# Patient Record
Sex: FEMALE | Race: WHITE | NOT HISPANIC OR LATINO | Employment: OTHER | URBAN - METROPOLITAN AREA
[De-identification: names, ages, dates, MRNs, and addresses within clinical notes are randomized per-mention and may not be internally consistent; named-entity substitution may affect disease eponyms.]

---

## 2017-03-14 PROBLEM — R43.0 LOSS OF SENSE OF SMELL: Status: ACTIVE | Noted: 2017-03-14

## 2017-03-14 PROBLEM — R09.81 NASAL CONGESTION: Status: ACTIVE | Noted: 2017-03-14

## 2017-03-14 PROBLEM — J34.89 LESION OF NOSTRIL: Status: ACTIVE | Noted: 2017-03-14

## 2017-05-03 PROBLEM — I83.90 VARICOSITIES OF LEG: Status: ACTIVE | Noted: 2017-05-03

## 2017-05-03 PROBLEM — F17.200 CURRENT SMOKER: Status: ACTIVE | Noted: 2017-05-03

## 2017-05-03 PROBLEM — M25.562 LEFT KNEE PAIN: Status: ACTIVE | Noted: 2017-05-03

## 2017-05-03 PROBLEM — M25.562 PAIN AND SWELLING OF LEFT KNEE: Status: ACTIVE | Noted: 2017-05-03

## 2017-05-03 PROBLEM — R09.81 NASAL CONGESTION: Status: RESOLVED | Noted: 2017-03-14 | Resolved: 2017-05-03

## 2017-05-03 PROBLEM — M25.462 PAIN AND SWELLING OF LEFT KNEE: Status: ACTIVE | Noted: 2017-05-03

## 2017-05-10 PROBLEM — R29.898 KNEE CLICKING: Status: ACTIVE | Noted: 2017-05-10

## 2017-05-10 PROBLEM — M25.362 INSTABILITY OF LEFT KNEE JOINT: Status: ACTIVE | Noted: 2017-05-10

## 2017-05-19 PROBLEM — R43.8 DECREASED SENSE OF SMELL: Status: ACTIVE | Noted: 2017-03-14

## 2017-05-29 ENCOUNTER — OFFICE VISIT (OUTPATIENT)
Dept: ORTHOPEDICS | Facility: CLINIC | Age: 62
End: 2017-05-29
Payer: COMMERCIAL

## 2017-05-29 VITALS
HEART RATE: 77 BPM | BODY MASS INDEX: 25.1 KG/M2 | DIASTOLIC BLOOD PRESSURE: 95 MMHG | HEIGHT: 64 IN | WEIGHT: 147 LBS | SYSTOLIC BLOOD PRESSURE: 149 MMHG

## 2017-05-29 DIAGNOSIS — M17.12 ARTHRITIS OF KNEE, LEFT: ICD-10-CM

## 2017-05-29 DIAGNOSIS — S83.209A MENISCUS TEAR: Primary | ICD-10-CM

## 2017-05-29 PROCEDURE — 99203 OFFICE O/P NEW LOW 30 MIN: CPT | Mod: 25,S$GLB,, | Performed by: ORTHOPAEDIC SURGERY

## 2017-05-29 PROCEDURE — 20610 DRAIN/INJ JOINT/BURSA W/O US: CPT | Mod: LT,S$GLB,, | Performed by: ORTHOPAEDIC SURGERY

## 2017-05-29 RX ORDER — TRIAMCINOLONE ACETONIDE 40 MG/ML
40 INJECTION, SUSPENSION INTRA-ARTICULAR; INTRAMUSCULAR
Status: DISCONTINUED | OUTPATIENT
Start: 2017-05-29 | End: 2017-05-29 | Stop reason: HOSPADM

## 2017-05-29 RX ADMIN — TRIAMCINOLONE ACETONIDE 40 MG: 40 INJECTION, SUSPENSION INTRA-ARTICULAR; INTRAMUSCULAR at 12:05

## 2017-05-29 NOTE — PROGRESS NOTES
Past Medical History:   Diagnosis Date    Attention deficit disorder without mention of hyperactivity     Breast cancer     Breast cancer 2010    left    Encounter for blood transfusion     Generalized osteoarthrosis, unspecified site     Hyperlipidemia     Hypertension     Spinal stenosis        Past Surgical History:   Procedure Laterality Date    BREAST SURGERY  2010    Double Mastectomy, Cancer    CARPAL TUNNEL RELEASE      EYE SURGERY      BILAT CATARACT    MASTECTOMY      double    SYMPATHECTOMY  1982 or 1983    TONSILLECTOMY         Current Outpatient Prescriptions   Medication Sig    aspirin 325 MG tablet Take 325 mg by mouth once daily.    benazepril (LOTENSIN) 20 MG tablet Take 1 tablet (20 mg total) by mouth once daily.    dextroamphetamine-amphetamine (ADDERALL) 20 mg tablet Take 1 tablet by mouth 2 (two) times daily as needed.    fluticasone (FLONASE) 50 mcg/actuation nasal spray 2 sprays by Each Nare route once daily.    lidocaine (LIDODERM) 5 % Place 1 patch onto the skin once daily. Remove & Discard patch within 12 hours or as directed by MD    multivitamin (ONE DAILY MULTIVITAMIN) per tablet Take 1 tablet by mouth once daily.     No current facility-administered medications for this visit.        Review of patient's allergies indicates:   Allergen Reactions    Zocor [simvastatin] Rash       Family History   Problem Relation Age of Onset    Heart disease Mother        Social History     Social History    Marital status:      Spouse name: N/A    Number of children: N/A    Years of education: N/A     Occupational History    Not on file.     Social History Main Topics    Smoking status: Current Every Day Smoker     Packs/day: 0.50     Years: 42.00     Types: Cigarettes    Smokeless tobacco: Not on file    Alcohol use Yes    Drug use: No    Sexual activity: Not on file     Other Topics Concern    Not on file     Social History Narrative    No narrative on file  "      Chief Complaint:   Chief Complaint   Patient presents with    Left Knee - Pain       Consulting Physician: Self, Aaareferral    History of present illness:    This is a 62 y.o. year old female who complains of left knee pain that is getting worse in last few months.  She puts her pain is a 4 out of 10 but worse with activities.  She states he occasionally has a sharp stabbing pain on the lateral aspect of the knee.    Review of Systems:    Constitution: Denies chills, fever, and sweats.  HENT: Denies headaches or blurry vision.  Cardiovascular: Denies chest pain or irregular heart beat.  Respiratory: Denies cough or shortness of breath.  Gastrointestinal: Denies abdominal pain, nausea, or vomiting.  Musculoskeletal:  Denies muscle cramps.  Neurological: Denies dizziness or focal weakness.  Psychiatric/Behavioral: Normal mental status.  Hematologic/Lymphatic: Denies bleeding problem or easy bruising/bleeding.  Skin: Denies rash or suspicious lesions.    Examination:    Vital Signs:    Vitals:    05/29/17 1041   BP: (!) 149/95   Pulse: 77   Weight: 66.7 kg (147 lb)   Height: 5' 3.5" (1.613 m)   PainSc:   4   PainLoc: Knee       Body mass index is 25.63 kg/m².    This a well-developed, well nourished patient in no acute distress.    Alert and oriented and cooperative to examination.       Physical Exam: Left Knee Exam    Gait   Normal    Skin  Rash:   None  Scars:   None    Inspection  Erythema:  None  Bruising:  None  Effusion:  None  Masses:  None  Lymphadenopathy: None    Range of Motion: 0 to 130° with pain    Medial Joint : Mild  Lateral Joint : Yes    Patellofemoral Tenderness: None  Patellofemoral Crepitus: None    Lachman:  Normal  Anterior Drawer: Normal  Posterior Drawer: Normal    Brian's:  Positive  Apley's:  Positive    Varus Stress:  Stable  Valgus Stress:  Stable    Strength:  5/5    Pulses:  Palpable distal    Sensation:  Intact          Imaging: MRI is available for " review today.  It shows a tear of the lateral meniscus that is complex in both the anterior horn and the body.  She also has some degenerative changes.        Assessment: Meniscus tear  -     Large Joint Aspiration/Injection    Arthritis of knee, left        Plan:  We'll go ahead and give her an injection today for her meniscus tear.  We explained to her that this will hopefully relieve her pain but if it does not we may need to do a arthroscopy of the knee.  At her next visit we will get x-rays of both knees.      DISCLAIMER: This note may have been dictated using voice recognition software and may contain grammatical errors.     NOTE: Consult report sent to referring provider via [employer] E[employer].

## 2017-05-29 NOTE — PROCEDURES
Large Joint Aspiration/Injection  Date/Time: 5/29/2017 12:04 PM  Performed by: JIAN WITT  Authorized by: JIAN WITT     Consent Done?:  Yes (Verbal)  Indications:  Pain  Timeout: Prior to procedure the correct patient, procedure, and site was verified      Location:  Knee  Site:  L knee  Prep: Patient was prepped and draped in usual sterile fashion    Approach:  Anteromedial  Medications:  40 mg triamcinolone acetonide 40 mg/mL

## 2017-06-07 ENCOUNTER — OFFICE VISIT (OUTPATIENT)
Dept: ORTHOPEDICS | Facility: CLINIC | Age: 62
End: 2017-06-07
Payer: COMMERCIAL

## 2017-06-07 VITALS
DIASTOLIC BLOOD PRESSURE: 83 MMHG | HEART RATE: 71 BPM | BODY MASS INDEX: 25.11 KG/M2 | SYSTOLIC BLOOD PRESSURE: 120 MMHG | WEIGHT: 147.06 LBS | HEIGHT: 64 IN

## 2017-06-07 DIAGNOSIS — M25.561 PAIN IN BOTH KNEES, UNSPECIFIED CHRONICITY: Primary | ICD-10-CM

## 2017-06-07 DIAGNOSIS — S83.242S ACUTE MEDIAL MENISCUS TEAR, LEFT, SEQUELA: Primary | ICD-10-CM

## 2017-06-07 DIAGNOSIS — M25.562 PAIN IN BOTH KNEES, UNSPECIFIED CHRONICITY: Primary | ICD-10-CM

## 2017-06-07 DIAGNOSIS — S83.209A MENISCUS TEAR: Primary | ICD-10-CM

## 2017-06-07 DIAGNOSIS — M17.12 ARTHRITIS OF KNEE, LEFT: ICD-10-CM

## 2017-06-07 PROCEDURE — 99214 OFFICE O/P EST MOD 30 MIN: CPT | Mod: 25,S$GLB,, | Performed by: ORTHOPAEDIC SURGERY

## 2017-06-07 PROCEDURE — 20610 DRAIN/INJ JOINT/BURSA W/O US: CPT | Mod: LT,S$GLB,, | Performed by: ORTHOPAEDIC SURGERY

## 2017-06-07 RX ORDER — TRIAMCINOLONE ACETONIDE 40 MG/ML
40 INJECTION, SUSPENSION INTRA-ARTICULAR; INTRAMUSCULAR
Status: DISCONTINUED | OUTPATIENT
Start: 2017-06-07 | End: 2017-06-08 | Stop reason: HOSPADM

## 2017-06-07 RX ADMIN — TRIAMCINOLONE ACETONIDE 40 MG: 40 INJECTION, SUSPENSION INTRA-ARTICULAR; INTRAMUSCULAR at 10:06

## 2017-06-08 RX ORDER — MUPIROCIN 20 MG/G
1 OINTMENT TOPICAL
Status: CANCELLED | OUTPATIENT
Start: 2017-06-08

## 2017-06-08 NOTE — PROGRESS NOTES
Past Medical History:   Diagnosis Date    Attention deficit disorder without mention of hyperactivity     Breast cancer     Breast cancer 2010    left    Encounter for blood transfusion     Generalized osteoarthrosis, unspecified site     Hyperlipidemia     Hypertension     Spinal stenosis        Past Surgical History:   Procedure Laterality Date    BREAST SURGERY  2010    Double Mastectomy, Cancer    CARPAL TUNNEL RELEASE      EYE SURGERY      BILAT CATARACT    MASTECTOMY      double    SYMPATHECTOMY  1982 or 1983    TONSILLECTOMY         Current Outpatient Prescriptions   Medication Sig    aspirin 325 MG tablet Take 325 mg by mouth once daily.    benazepril (LOTENSIN) 20 MG tablet Take 1 tablet (20 mg total) by mouth once daily.    dextroamphetamine-amphetamine (ADDERALL) 20 mg tablet Take 1 tablet by mouth 2 (two) times daily as needed.    fluticasone (FLONASE) 50 mcg/actuation nasal spray 2 sprays by Each Nare route once daily.    lidocaine (LIDODERM) 5 % Place 1 patch onto the skin once daily. Remove & Discard patch within 12 hours or as directed by MD    multivitamin (ONE DAILY MULTIVITAMIN) per tablet Take 1 tablet by mouth once daily.     No current facility-administered medications for this visit.        Review of patient's allergies indicates:   Allergen Reactions    Zocor [simvastatin] Rash       Family History   Problem Relation Age of Onset    Heart disease Mother        Social History     Social History    Marital status:      Spouse name: N/A    Number of children: N/A    Years of education: N/A     Occupational History    Not on file.     Social History Main Topics    Smoking status: Current Every Day Smoker     Packs/day: 0.50     Years: 42.00     Types: Cigarettes    Smokeless tobacco: Not on file    Alcohol use Yes    Drug use: No    Sexual activity: Not on file     Other Topics Concern    Not on file     Social History Narrative    No narrative on file  "      Chief Complaint:   Chief Complaint   Patient presents with    Right Knee - Pain    Left Knee - Pain       Consulting Physician: No ref. provider found    History of present illness:    This is a 62 y.o. year old female who complains of left knee pain that is getting worse in last few months.  She puts her pain is a 6 out of 10 but worse with activities. Injection helped a little but wants surgery.    Review of Systems:    Constitution: Denies chills, fever, and sweats.  HENT: Denies headaches or blurry vision.  Cardiovascular: Denies chest pain or irregular heart beat.  Respiratory: Denies cough or shortness of breath.  Gastrointestinal: Denies abdominal pain, nausea, or vomiting.  Musculoskeletal:  Denies muscle cramps.  Neurological: Denies dizziness or focal weakness.  Psychiatric/Behavioral: Normal mental status.  Hematologic/Lymphatic: Denies bleeding problem or easy bruising/bleeding.  Skin: Denies rash or suspicious lesions.    Examination:    Vital Signs:    Vitals:    06/07/17 1514   BP: 120/83   Pulse: 71   Weight: 66.7 kg (147 lb 0.8 oz)   Height: 5' 3.5" (1.613 m)   PainSc:   6   PainLoc: Knee       Body mass index is 25.64 kg/m².    This a well-developed, well nourished patient in no acute distress.    Alert and oriented and cooperative to examination.       Physical Exam: Left Knee Exam    Gait   Normal    Skin  Rash:   None  Scars:   None    Inspection  Erythema:  None  Bruising:  None  Effusion:  None  Masses:  None  Lymphadenopathy: None    Range of Motion: 0 to 130° with pain    Medial Joint : Mild  Lateral Joint : Yes    Patellofemoral Tenderness: None  Patellofemoral Crepitus: None    Lachman:  Normal  Anterior Drawer: Normal  Posterior Drawer: Normal    Brian's:  Positive  Apley's:  Positive    Varus Stress:  Stable  Valgus Stress:  Stable    Strength:  5/5    Pulses:  Palpable distal    Sensation:  Intact          Imaging: XR of both knees show moderate changes " in the left knee mostly lateral and mild changes in the right knee. MRI shows a tear of the lateral meniscus that is complex in both the anterior horn and the body and a small posterior horn medial meniscus tear.  She also has some degenerative changes.        Assessment: Meniscus tear    Arthritis of knee, left  -     Large Joint Aspiration/Injection        Plan:  We discussed options again today and she would like to proceed with arthroscopy of her knee. We discussed risks and benefits including partial pain relief due to her underlying arthritis. Informed consent obtained.     The risks, benefits, and alternatives to the procedure were explained to the patient including, but not limited to: incomplete pain relief, surgical failure, hardware failure, need for further procedures, damage to nerves, arteries, blood vessels and other structures, infection, Deep Vein Thrombosis (DVT), Pulmonary Embolus (PE), Complex Regional Pain Syndrome as well as general anesthetic complications including seizure, stroke, heart attack and even death. The patient understood these risks and wished to proceed and signed the informed consent. All questions were answered. No guarantees were implied or stated.    We will obtain the necessary clearances and schedule the patient for surgery.          DISCLAIMER: This note may have been dictated using voice recognition software and may contain grammatical errors.     NOTE: Consult report sent to referring provider via [employer].

## 2017-06-08 NOTE — PROCEDURES
Large Joint Aspiration/Injection  Date/Time: 6/7/2017 10:23 PM  Performed by: JIAN WITT  Authorized by: JIAN WITT     Consent Done?:  Yes (Verbal)  Indications:  Pain  Timeout: Prior to procedure the correct patient, procedure, and site was verified      Location:  Knee  Site:  L knee  Prep: Patient was prepped and draped in usual sterile fashion    Approach:  Anteromedial  Medications:  40 mg triamcinolone acetonide 40 mg/mL

## 2017-06-16 ENCOUNTER — HOSPITAL ENCOUNTER (OUTPATIENT)
Dept: RADIOLOGY | Facility: HOSPITAL | Age: 62
Discharge: HOME | End: 2017-06-16
Attending: ORTHOPAEDIC SURGERY
Payer: COMMERCIAL

## 2017-06-16 ENCOUNTER — HOSPITAL ENCOUNTER (OUTPATIENT)
Dept: PREADMISSION TESTING | Facility: HOSPITAL | Age: 62
Discharge: HOME | End: 2017-06-16
Attending: ORTHOPAEDIC SURGERY
Payer: COMMERCIAL

## 2017-06-16 VITALS — WEIGHT: 145 LBS | BODY MASS INDEX: 24.75 KG/M2 | HEIGHT: 64 IN

## 2017-06-16 DIAGNOSIS — S83.209A MENISCUS TEAR: ICD-10-CM

## 2017-06-16 DIAGNOSIS — M17.12 ARTHRITIS OF KNEE, LEFT: ICD-10-CM

## 2017-06-16 LAB
ANION GAP SERPL CALC-SCNC: 10 MMOL/L
BASOPHILS # BLD AUTO: 0.1 K/UL
BASOPHILS NFR BLD: 0.6 %
BILIRUB UR QL STRIP: NEGATIVE
BUN SERPL-MCNC: 15 MG/DL
CALCIUM SERPL-MCNC: 9.8 MG/DL
CHLORIDE SERPL-SCNC: 103 MMOL/L
CLARITY UR: CLEAR
CO2 SERPL-SCNC: 28 MMOL/L
COLOR UR: YELLOW
CREAT SERPL-MCNC: 0.7 MG/DL
DIFFERENTIAL METHOD: ABNORMAL
EOSINOPHIL # BLD AUTO: 0.2 K/UL
EOSINOPHIL NFR BLD: 2.2 %
ERYTHROCYTE [DISTWIDTH] IN BLOOD BY AUTOMATED COUNT: 13.6 %
EST. GFR  (AFRICAN AMERICAN): >60 ML/MIN/1.73 M^2
EST. GFR  (NON AFRICAN AMERICAN): >60 ML/MIN/1.73 M^2
GLUCOSE SERPL-MCNC: 68 MG/DL
GLUCOSE UR QL STRIP: NEGATIVE
HCT VFR BLD AUTO: 46.2 %
HGB BLD-MCNC: 15.4 G/DL
HGB UR QL STRIP: ABNORMAL
KETONES UR QL STRIP: NEGATIVE
LEUKOCYTE ESTERASE UR QL STRIP: NEGATIVE
LYMPHOCYTES # BLD AUTO: 2.4 K/UL
LYMPHOCYTES NFR BLD: 25.5 %
MCH RBC QN AUTO: 32.8 PG
MCHC RBC AUTO-ENTMCNC: 33.4 %
MCV RBC AUTO: 98 FL
MONOCYTES # BLD AUTO: 0.6 K/UL
MONOCYTES NFR BLD: 6.3 %
NEUTROPHILS # BLD AUTO: 6.2 K/UL
NEUTROPHILS NFR BLD: 65.4 %
NITRITE UR QL STRIP: NEGATIVE
PH UR STRIP: 6 [PH] (ref 5–8)
PLATELET # BLD AUTO: 315 K/UL
PMV BLD AUTO: 7.9 FL
POTASSIUM SERPL-SCNC: 4.3 MMOL/L
PROT UR QL STRIP: NEGATIVE
RBC # BLD AUTO: 4.71 M/UL
SODIUM SERPL-SCNC: 141 MMOL/L
SP GR UR STRIP: 1.01 (ref 1–1.03)
URN SPEC COLLECT METH UR: ABNORMAL
UROBILINOGEN UR STRIP-ACNC: NEGATIVE EU/DL
WBC # BLD AUTO: 9.5 K/UL

## 2017-06-16 PROCEDURE — 99900103 DSU ONLY-NO CHARGE-INITIAL HR (STAT)

## 2017-06-16 PROCEDURE — 71020 XR CHEST PA AND LATERAL: CPT | Mod: 26,,, | Performed by: RADIOLOGY

## 2017-06-16 PROCEDURE — 71020 XR CHEST PA AND LATERAL: CPT | Mod: TC

## 2017-06-16 PROCEDURE — 81003 URINALYSIS AUTO W/O SCOPE: CPT

## 2017-06-16 PROCEDURE — 93010 ELECTROCARDIOGRAM REPORT: CPT | Mod: S$GLB,,, | Performed by: INTERNAL MEDICINE

## 2017-06-16 PROCEDURE — 99900104 DSU ONLY-NO CHARGE-EA ADD'L HR (STAT)

## 2017-06-16 PROCEDURE — 36415 COLL VENOUS BLD VENIPUNCTURE: CPT

## 2017-06-16 PROCEDURE — 80048 BASIC METABOLIC PNL TOTAL CA: CPT

## 2017-06-16 PROCEDURE — 93005 ELECTROCARDIOGRAM TRACING: CPT

## 2017-06-16 PROCEDURE — 85025 COMPLETE CBC W/AUTO DIFF WBC: CPT

## 2017-06-16 RX ORDER — MAGNESIUM 250 MG
TABLET ORAL DAILY
COMMUNITY

## 2017-06-26 PROBLEM — S83.209A MENISCUS TEAR: Status: ACTIVE | Noted: 2017-06-26

## 2017-06-28 ENCOUNTER — ANESTHESIA EVENT (OUTPATIENT)
Dept: SURGERY | Facility: HOSPITAL | Age: 62
End: 2017-06-28
Payer: COMMERCIAL

## 2017-06-29 ENCOUNTER — HOSPITAL ENCOUNTER (OUTPATIENT)
Facility: HOSPITAL | Age: 62
Discharge: HOME | End: 2017-06-29
Attending: ORTHOPAEDIC SURGERY | Admitting: ORTHOPAEDIC SURGERY
Payer: COMMERCIAL

## 2017-06-29 ENCOUNTER — ANESTHESIA (OUTPATIENT)
Dept: SURGERY | Facility: HOSPITAL | Age: 62
End: 2017-06-29
Payer: COMMERCIAL

## 2017-06-29 VITALS
BODY MASS INDEX: 24.75 KG/M2 | WEIGHT: 145 LBS | HEIGHT: 64 IN | TEMPERATURE: 98 F | SYSTOLIC BLOOD PRESSURE: 113 MMHG | HEART RATE: 52 BPM | OXYGEN SATURATION: 95 % | DIASTOLIC BLOOD PRESSURE: 74 MMHG | RESPIRATION RATE: 18 BRPM

## 2017-06-29 DIAGNOSIS — S83.209A MENISCUS TEAR: ICD-10-CM

## 2017-06-29 DIAGNOSIS — M17.12 ARTHRITIS OF KNEE, LEFT: ICD-10-CM

## 2017-06-29 PROCEDURE — 25000003 PHARM REV CODE 250: Performed by: ANESTHESIOLOGY

## 2017-06-29 PROCEDURE — 25000003 PHARM REV CODE 250: Performed by: NURSE ANESTHETIST, CERTIFIED REGISTERED

## 2017-06-29 PROCEDURE — 63600175 PHARM REV CODE 636 W HCPCS: Performed by: NURSE ANESTHETIST, CERTIFIED REGISTERED

## 2017-06-29 PROCEDURE — 71000015 HC POSTOP RECOV 1ST HR: Performed by: ORTHOPAEDIC SURGERY

## 2017-06-29 PROCEDURE — 29880 ARTHRS KNE SRG MNISECTMY M&L: CPT | Mod: LT,,, | Performed by: ORTHOPAEDIC SURGERY

## 2017-06-29 PROCEDURE — 27201423 OPTIME MED/SURG SUP & DEVICES STERILE SUPPLY: Performed by: ORTHOPAEDIC SURGERY

## 2017-06-29 PROCEDURE — 36000711: Performed by: ORTHOPAEDIC SURGERY

## 2017-06-29 PROCEDURE — 27200651 HC AIRWAY, LMA: Performed by: NURSE ANESTHETIST, CERTIFIED REGISTERED

## 2017-06-29 PROCEDURE — D9220A PRA ANESTHESIA: Mod: ANES,,, | Performed by: ANESTHESIOLOGY

## 2017-06-29 PROCEDURE — 99900104 DSU ONLY-NO CHARGE-EA ADD'L HR (STAT): Performed by: ORTHOPAEDIC SURGERY

## 2017-06-29 PROCEDURE — 63600175 PHARM REV CODE 636 W HCPCS: Performed by: ANESTHESIOLOGY

## 2017-06-29 PROCEDURE — 99900103 DSU ONLY-NO CHARGE-INITIAL HR (STAT): Performed by: ORTHOPAEDIC SURGERY

## 2017-06-29 PROCEDURE — 25000003 PHARM REV CODE 250: Performed by: ORTHOPAEDIC SURGERY

## 2017-06-29 PROCEDURE — 37000008 HC ANESTHESIA 1ST 15 MINUTES: Performed by: ORTHOPAEDIC SURGERY

## 2017-06-29 PROCEDURE — 71000039 HC RECOVERY, EACH ADD'L HOUR: Performed by: ORTHOPAEDIC SURGERY

## 2017-06-29 PROCEDURE — 37000009 HC ANESTHESIA EA ADD 15 MINS: Performed by: ORTHOPAEDIC SURGERY

## 2017-06-29 PROCEDURE — 63600175 PHARM REV CODE 636 W HCPCS: Performed by: ORTHOPAEDIC SURGERY

## 2017-06-29 PROCEDURE — 36000710: Performed by: ORTHOPAEDIC SURGERY

## 2017-06-29 PROCEDURE — D9220A PRA ANESTHESIA: Mod: CRNA,,, | Performed by: NURSE ANESTHETIST, CERTIFIED REGISTERED

## 2017-06-29 PROCEDURE — 71000033 HC RECOVERY, INTIAL HOUR: Performed by: ORTHOPAEDIC SURGERY

## 2017-06-29 RX ORDER — MIDAZOLAM HYDROCHLORIDE 1 MG/ML
INJECTION, SOLUTION INTRAMUSCULAR; INTRAVENOUS
Status: DISCONTINUED | OUTPATIENT
Start: 2017-06-29 | End: 2017-06-29

## 2017-06-29 RX ORDER — PHENYLEPHRINE HYDROCHLORIDE 10 MG/ML
INJECTION INTRAVENOUS
Status: DISCONTINUED | OUTPATIENT
Start: 2017-06-29 | End: 2017-06-29

## 2017-06-29 RX ORDER — BUPIVACAINE HYDROCHLORIDE 5 MG/ML
INJECTION, SOLUTION EPIDURAL; INTRACAUDAL
Status: DISCONTINUED | OUTPATIENT
Start: 2017-06-29 | End: 2017-06-29 | Stop reason: HOSPADM

## 2017-06-29 RX ORDER — FENTANYL CITRATE 50 UG/ML
25 INJECTION, SOLUTION INTRAMUSCULAR; INTRAVENOUS EVERY 5 MIN PRN
Status: COMPLETED | OUTPATIENT
Start: 2017-06-29 | End: 2017-06-29

## 2017-06-29 RX ORDER — FENTANYL CITRATE 50 UG/ML
INJECTION, SOLUTION INTRAMUSCULAR; INTRAVENOUS
Status: DISCONTINUED | OUTPATIENT
Start: 2017-06-29 | End: 2017-06-29

## 2017-06-29 RX ORDER — CEFAZOLIN SODIUM 2 G/50ML
2 SOLUTION INTRAVENOUS
Status: COMPLETED | OUTPATIENT
Start: 2017-06-29 | End: 2017-06-29

## 2017-06-29 RX ORDER — SODIUM CHLORIDE, SODIUM LACTATE, POTASSIUM CHLORIDE, CALCIUM CHLORIDE 600; 310; 30; 20 MG/100ML; MG/100ML; MG/100ML; MG/100ML
INJECTION, SOLUTION INTRAVENOUS CONTINUOUS
Status: DISCONTINUED | OUTPATIENT
Start: 2017-06-29 | End: 2017-06-29 | Stop reason: HOSPADM

## 2017-06-29 RX ORDER — SODIUM CHLORIDE 0.9 % (FLUSH) 0.9 %
3 SYRINGE (ML) INJECTION
Status: DISCONTINUED | OUTPATIENT
Start: 2017-06-29 | End: 2017-06-29 | Stop reason: HOSPADM

## 2017-06-29 RX ORDER — MUPIROCIN 20 MG/G
1 OINTMENT TOPICAL
Status: COMPLETED | OUTPATIENT
Start: 2017-06-29 | End: 2017-06-29

## 2017-06-29 RX ORDER — HYDROCODONE BITARTRATE AND ACETAMINOPHEN 5; 325 MG/1; MG/1
1 TABLET ORAL EVERY 4 HOURS PRN
Status: CANCELLED | OUTPATIENT
Start: 2017-06-29

## 2017-06-29 RX ORDER — HYDROCODONE BITARTRATE AND ACETAMINOPHEN 10; 325 MG/1; MG/1
1 TABLET ORAL EVERY 4 HOURS PRN
Status: CANCELLED | OUTPATIENT
Start: 2017-06-29

## 2017-06-29 RX ORDER — LIDOCAINE HCL/PF 100 MG/5ML
SYRINGE (ML) INTRAVENOUS
Status: DISCONTINUED | OUTPATIENT
Start: 2017-06-29 | End: 2017-06-29

## 2017-06-29 RX ORDER — EPINEPHRINE 1 MG/ML
INJECTION, SOLUTION INTRACARDIAC; INTRAMUSCULAR; INTRAVENOUS; SUBCUTANEOUS
Status: DISCONTINUED | OUTPATIENT
Start: 2017-06-29 | End: 2017-06-29 | Stop reason: HOSPADM

## 2017-06-29 RX ORDER — LIDOCAINE HYDROCHLORIDE 10 MG/ML
1 INJECTION, SOLUTION EPIDURAL; INFILTRATION; INTRACAUDAL; PERINEURAL ONCE
Status: COMPLETED | OUTPATIENT
Start: 2017-06-29 | End: 2017-06-29

## 2017-06-29 RX ORDER — OXYCODONE AND ACETAMINOPHEN 5; 325 MG/1; MG/1
1-2 TABLET ORAL EVERY 6 HOURS PRN
Qty: 60 TABLET | Refills: 0 | Status: SHIPPED | OUTPATIENT
Start: 2017-06-29 | End: 2017-07-24

## 2017-06-29 RX ORDER — OXYCODONE AND ACETAMINOPHEN 5; 325 MG/1; MG/1
1 TABLET ORAL
Status: DISCONTINUED | OUTPATIENT
Start: 2017-06-29 | End: 2017-06-29 | Stop reason: HOSPADM

## 2017-06-29 RX ORDER — ACETAMINOPHEN 10 MG/ML
INJECTION, SOLUTION INTRAVENOUS
Status: DISCONTINUED | OUTPATIENT
Start: 2017-06-29 | End: 2017-06-29

## 2017-06-29 RX ORDER — IBUPROFEN 600 MG/1
600 TABLET ORAL 4 TIMES DAILY
Qty: 60 TABLET | Refills: 0 | Status: SHIPPED | OUTPATIENT
Start: 2017-06-29

## 2017-06-29 RX ORDER — OXYCODONE HYDROCHLORIDE 5 MG/1
5 TABLET ORAL ONCE AS NEEDED
Status: COMPLETED | OUTPATIENT
Start: 2017-06-29 | End: 2017-06-29

## 2017-06-29 RX ORDER — IBUPROFEN 400 MG/1
800 TABLET ORAL 3 TIMES DAILY
Status: CANCELLED | OUTPATIENT
Start: 2017-06-29

## 2017-06-29 RX ORDER — DEXAMETHASONE SODIUM PHOSPHATE 4 MG/ML
INJECTION, SOLUTION INTRA-ARTICULAR; INTRALESIONAL; INTRAMUSCULAR; INTRAVENOUS; SOFT TISSUE
Status: DISCONTINUED | OUTPATIENT
Start: 2017-06-29 | End: 2017-06-29

## 2017-06-29 RX ORDER — ONDANSETRON HYDROCHLORIDE 2 MG/ML
INJECTION, SOLUTION INTRAMUSCULAR; INTRAVENOUS
Status: DISCONTINUED | OUTPATIENT
Start: 2017-06-29 | End: 2017-06-29

## 2017-06-29 RX ORDER — PROPOFOL 10 MG/ML
VIAL (ML) INTRAVENOUS
Status: DISCONTINUED | OUTPATIENT
Start: 2017-06-29 | End: 2017-06-29

## 2017-06-29 RX ORDER — METOCLOPRAMIDE HYDROCHLORIDE 5 MG/ML
10 INJECTION INTRAMUSCULAR; INTRAVENOUS EVERY 10 MIN PRN
Status: COMPLETED | OUTPATIENT
Start: 2017-06-29 | End: 2017-06-29

## 2017-06-29 RX ADMIN — FENTANYL CITRATE 25 MCG: 50 INJECTION, SOLUTION INTRAMUSCULAR; INTRAVENOUS at 12:06

## 2017-06-29 RX ADMIN — PROPOFOL 150 MG: 10 INJECTION, EMULSION INTRAVENOUS at 11:06

## 2017-06-29 RX ADMIN — OXYCODONE HYDROCHLORIDE 5 MG: 5 TABLET ORAL at 12:06

## 2017-06-29 RX ADMIN — ACETAMINOPHEN 1000 MG: 10 INJECTION, SOLUTION INTRAVENOUS at 11:06

## 2017-06-29 RX ADMIN — FENTANYL CITRATE 25 MCG: 50 INJECTION, SOLUTION INTRAMUSCULAR; INTRAVENOUS at 01:06

## 2017-06-29 RX ADMIN — LIDOCAINE HYDROCHLORIDE 10 MG: 10 INJECTION, SOLUTION EPIDURAL; INFILTRATION; INTRACAUDAL; PERINEURAL at 08:06

## 2017-06-29 RX ADMIN — FENTANYL CITRATE 50 MCG: 50 INJECTION INTRAMUSCULAR; INTRAVENOUS at 11:06

## 2017-06-29 RX ADMIN — CEFAZOLIN SODIUM 2 G: 2 SOLUTION INTRAVENOUS at 11:06

## 2017-06-29 RX ADMIN — ONDANSETRON 4 MG: 2 INJECTION, SOLUTION INTRAMUSCULAR; INTRAVENOUS at 11:06

## 2017-06-29 RX ADMIN — SODIUM CHLORIDE, SODIUM LACTATE, POTASSIUM CHLORIDE, AND CALCIUM CHLORIDE: .6; .31; .03; .02 INJECTION, SOLUTION INTRAVENOUS at 08:06

## 2017-06-29 RX ADMIN — SODIUM CHLORIDE, SODIUM LACTATE, POTASSIUM CHLORIDE, AND CALCIUM CHLORIDE: .6; .31; .03; .02 INJECTION, SOLUTION INTRAVENOUS at 12:06

## 2017-06-29 RX ADMIN — METOCLOPRAMIDE 10 MG: 5 INJECTION, SOLUTION INTRAMUSCULAR; INTRAVENOUS at 12:06

## 2017-06-29 RX ADMIN — DEXAMETHASONE SODIUM PHOSPHATE 4 MG: 4 INJECTION, SOLUTION INTRAMUSCULAR; INTRAVENOUS at 11:06

## 2017-06-29 RX ADMIN — MIDAZOLAM 2 MG: 1 INJECTION INTRAMUSCULAR; INTRAVENOUS at 11:06

## 2017-06-29 RX ADMIN — PHENYLEPHRINE HYDROCHLORIDE 100 MCG: 10 INJECTION INTRAVENOUS at 11:06

## 2017-06-29 RX ADMIN — LIDOCAINE HYDROCHLORIDE 100 MG: 20 INJECTION, SOLUTION INTRAVENOUS at 11:06

## 2017-06-29 RX ADMIN — MUPIROCIN 1 G: 20 OINTMENT TOPICAL at 11:06

## 2017-06-29 RX ADMIN — PHENYLEPHRINE HYDROCHLORIDE 100 MCG: 10 INJECTION INTRAVENOUS at 12:06

## 2017-06-29 NOTE — H&P (VIEW-ONLY)
Past Medical History:   Diagnosis Date    Attention deficit disorder without mention of hyperactivity     Breast cancer     Breast cancer 2010    left    Encounter for blood transfusion     Generalized osteoarthrosis, unspecified site     Hyperlipidemia     Hypertension     Spinal stenosis        Past Surgical History:   Procedure Laterality Date    BREAST SURGERY  2010    Double Mastectomy, Cancer    CARPAL TUNNEL RELEASE      EYE SURGERY      BILAT CATARACT    MASTECTOMY      double    SYMPATHECTOMY  1982 or 1983    TONSILLECTOMY         Current Outpatient Prescriptions   Medication Sig    aspirin 325 MG tablet Take 325 mg by mouth once daily.    benazepril (LOTENSIN) 20 MG tablet Take 1 tablet (20 mg total) by mouth once daily.    dextroamphetamine-amphetamine (ADDERALL) 20 mg tablet Take 1 tablet by mouth 2 (two) times daily as needed.    fluticasone (FLONASE) 50 mcg/actuation nasal spray 2 sprays by Each Nare route once daily.    lidocaine (LIDODERM) 5 % Place 1 patch onto the skin once daily. Remove & Discard patch within 12 hours or as directed by MD    multivitamin (ONE DAILY MULTIVITAMIN) per tablet Take 1 tablet by mouth once daily.     No current facility-administered medications for this visit.        Review of patient's allergies indicates:   Allergen Reactions    Zocor [simvastatin] Rash       Family History   Problem Relation Age of Onset    Heart disease Mother        Social History     Social History    Marital status:      Spouse name: N/A    Number of children: N/A    Years of education: N/A     Occupational History    Not on file.     Social History Main Topics    Smoking status: Current Every Day Smoker     Packs/day: 0.50     Years: 42.00     Types: Cigarettes    Smokeless tobacco: Not on file    Alcohol use Yes    Drug use: No    Sexual activity: Not on file     Other Topics Concern    Not on file     Social History Narrative    No narrative on file  "      Chief Complaint:   Chief Complaint   Patient presents with    Right Knee - Pain    Left Knee - Pain       Consulting Physician: No ref. provider found    History of present illness:    This is a 62 y.o. year old female who complains of left knee pain that is getting worse in last few months.  She puts her pain is a 6 out of 10 but worse with activities. Injection helped a little but wants surgery.    Review of Systems:    Constitution: Denies chills, fever, and sweats.  HENT: Denies headaches or blurry vision.  Cardiovascular: Denies chest pain or irregular heart beat.  Respiratory: Denies cough or shortness of breath.  Gastrointestinal: Denies abdominal pain, nausea, or vomiting.  Musculoskeletal:  Denies muscle cramps.  Neurological: Denies dizziness or focal weakness.  Psychiatric/Behavioral: Normal mental status.  Hematologic/Lymphatic: Denies bleeding problem or easy bruising/bleeding.  Skin: Denies rash or suspicious lesions.    Examination:    Vital Signs:    Vitals:    06/07/17 1514   BP: 120/83   Pulse: 71   Weight: 66.7 kg (147 lb 0.8 oz)   Height: 5' 3.5" (1.613 m)   PainSc:   6   PainLoc: Knee       Body mass index is 25.64 kg/m².    This a well-developed, well nourished patient in no acute distress.    Alert and oriented and cooperative to examination.       Physical Exam: Left Knee Exam    Gait   Normal    Skin  Rash:   None  Scars:   None    Inspection  Erythema:  None  Bruising:  None  Effusion:  None  Masses:  None  Lymphadenopathy: None    Range of Motion: 0 to 130° with pain    Medial Joint : Mild  Lateral Joint : Yes    Patellofemoral Tenderness: None  Patellofemoral Crepitus: None    Lachman:  Normal  Anterior Drawer: Normal  Posterior Drawer: Normal    Brian's:  Positive  Apley's:  Positive    Varus Stress:  Stable  Valgus Stress:  Stable    Strength:  5/5    Pulses:  Palpable distal    Sensation:  Intact          Imaging: XR of both knees show moderate changes " in the left knee mostly lateral and mild changes in the right knee. MRI shows a tear of the lateral meniscus that is complex in both the anterior horn and the body and a small posterior horn medial meniscus tear.  She also has some degenerative changes.        Assessment: Meniscus tear    Arthritis of knee, left  -     Large Joint Aspiration/Injection        Plan:  We discussed options again today and she would like to proceed with arthroscopy of her knee. We discussed risks and benefits including partial pain relief due to her underlying arthritis. Informed consent obtained.     The risks, benefits, and alternatives to the procedure were explained to the patient including, but not limited to: incomplete pain relief, surgical failure, hardware failure, need for further procedures, damage to nerves, arteries, blood vessels and other structures, infection, Deep Vein Thrombosis (DVT), Pulmonary Embolus (PE), Complex Regional Pain Syndrome as well as general anesthetic complications including seizure, stroke, heart attack and even death. The patient understood these risks and wished to proceed and signed the informed consent. All questions were answered. No guarantees were implied or stated.    We will obtain the necessary clearances and schedule the patient for surgery.          DISCLAIMER: This note may have been dictated using voice recognition software and may contain grammatical errors.     NOTE: Consult report sent to referring provider via [employer].

## 2017-06-29 NOTE — PLAN OF CARE
Pt states feels comfortable with pain rated 2 on scale 1-10.  Left knee with ace wrap dressing = D&I.  Pulse to left foot = +2.  Pt able to move toes with no problems noted.  Pt tolerated po intake with n/v.  Pt states already has crutches at home.  The ice machine instructions reviewed with discharge instructions.

## 2017-06-29 NOTE — INTERVAL H&P NOTE
The patient has been examined and the H&P has been reviewed:    I concur with the findings and no changes have occurred since H&P was written.    Anesthesia/Surgery risks, benefits and alternative options discussed and understood by patient/family.          Active Hospital Problems    Diagnosis  POA    Meniscus tear [S83.784A]  Yes      Resolved Hospital Problems    Diagnosis Date Resolved POA   No resolved problems to display.

## 2017-06-29 NOTE — TRANSFER OF CARE
"Anesthesia Transfer of Care Note    Patient: Natalie Domingo    Procedure(s) Performed: Procedure(s) (LRB):  ARTHROSCOPY-MENISCECTOMY (Left)    Patient location: PACU    Anesthesia Type: general    Transport from OR: Transported from OR on room air with adequate spontaneous ventilation    Post pain: adequate analgesia    Post assessment: no apparent anesthetic complications    Post vital signs: stable    Level of consciousness: awake, oriented and alert    Nausea/Vomiting: no nausea/vomiting    Complications: none    Transfer of care protocol was followed      Last vitals:   Visit Vitals  /76 (BP Location: Right arm, Patient Position: Lying, BP Method: Automatic)   Pulse 71   Temp 36.4 °C (97.5 °F) (Oral)   Resp 18   Ht 5' 3.5" (1.613 m)   Wt 65.8 kg (145 lb)   SpO2 98%   BMI 25.28 kg/m²     "

## 2017-06-29 NOTE — BRIEF OP NOTE
[facility]  Brief Operative Note     SUMMARY     Surgery Date: 6/29/2017     Surgeon(s) and Role:     * Vinny Zarate MD - Primary    Assisting Surgeon: None    Pre-op Diagnosis:  Arthritis of knee, left [M17.12]  Meniscus tear [S83.209A]    Post-op Diagnosis:  Post-Op Diagnosis Codes:     * Arthritis of knee, left [M17.12]     * Meniscus tear [S83.209A]    Procedure(s) (LRB):  ARTHROSCOPY-MENISCECTOMY (Left)    Anesthesia: General    Description of the findings of the procedure: Left knee arthroscopy with medial and lateral menisectomy    Findings/Key Components: See Dictation     Estimated Blood Loss: 10 mL         Specimens:   Specimen (12h ago through future)    None          Discharge Note    SUMMARY     Admit Date: 6/29/2017    Discharge Date and Time: 6/29/2017     Hospital Course : Patient Tolerated Procedure Well     Final Diagnosis: Post-Op Diagnosis Codes:     * Arthritis of knee, left [M17.12]     * Meniscus tear [S83.209A]    Disposition: Home or Self Care    Follow Up/Patient Instructions:     Medications:  Reconciled Home Medications: Current Discharge Medication List      START taking these medications    Details   ibuprofen (ADVIL,MOTRIN) 600 MG tablet Take 1 tablet (600 mg total) by mouth 4 (four) times daily.  Qty: 60 tablet, Refills: 0      oxycodone-acetaminophen (PERCOCET) 5-325 mg per tablet Take 1-2 tablets by mouth every 6 (six) hours as needed for Pain.  Qty: 60 tablet, Refills: 0         CONTINUE these medications which have NOT CHANGED    Details   aspirin 325 MG tablet Take 325 mg by mouth once daily.      benazepril (LOTENSIN) 20 MG tablet Take 1 tablet (20 mg total) by mouth once daily.  Qty: 90 tablet, Refills: 3    Associated Diagnoses: Essential hypertension      dextroamphetamine-amphetamine (ADDERALL) 20 mg tablet Take 1 tablet by mouth 2 (two) times daily as needed.  Qty: 60 tablet, Refills: 0    Associated Diagnoses: ADD (attention deficit disorder)  "without hyperactivity      fluticasone (FLONASE) 50 mcg/actuation nasal spray 2 sprays by Each Nare route once daily.  Qty: 16 g, Refills: 1    Associated Diagnoses: Nasal congestion; Loss of sense of smell      magnesium 250 mg Tab Take by mouth once daily.      multivitamin (ONE DAILY MULTIVITAMIN) per tablet Take 1 tablet by mouth once daily.      lidocaine (LIDODERM) 5 % Place 1 patch onto the skin once daily. Remove & Discard patch within 12 hours or as directed by MD  Qty: 30 patch, Refills: 3             Discharge Procedure Orders  CRUTCHES FOR HOME USE   Order Specific Question Answer Comments   Type: Axillary    Height: 5' 3.5" (1.613 m)    Weight: 65.8 kg (145 lb)    Length of need (1-99 months): 1      Diet general     Activity as tolerated     Shower on day dressing removed (No bath)     Ice to affected area     Weight bearing as tolerated     No driving, operating heavy equipment or signing legal documents while taking pain medication     Call MD for:  temperature >100.4     Call MD for:  persistent nausea and vomiting     Call MD for:  severe uncontrolled pain     Call MD for:  difficulty breathing, headache or visual disturbances     Call MD for:  redness, tenderness, or signs of infection (pain, swelling, redness, odor or green/yellow discharge around incision site)     Call MD for:  hives     Call MD for:  persistent dizziness or light-headedness     Call MD for:  extreme fatigue     Remove dressing in 48 hours       Follow-up Information     Vinny Zarate MD On 7/10/2017.    Specialties:  Sports Medicine, Orthopedic Surgery  Contact information:  [address] [place] [id number]  [phone]               Dictation #1  MRN:[id number]  CSN:[id number]  [id number]  "

## 2017-06-29 NOTE — ANESTHESIA PREPROCEDURE EVALUATION
06/29/2017  Natalie Domingo is a 62 y.o., female.    Anesthesia Evaluation    I have reviewed the Patient Summary Reports.    I have reviewed the Nursing Notes.   I have reviewed the Medications.     Review of Systems  Anesthesia Hx:  Denies Family Hx of Anesthesia complications.   Denies Personal Hx of Anesthesia complications.   Cardiovascular:   Hypertension    Pulmonary:  Pulmonary Normal    Renal/:  Renal/ Normal     Hepatic/GI:  Hepatic/GI Normal    Musculoskeletal:   Arthritis     Neurological:   Headaches    Endocrine:  Endocrine Normal    Psych:   Psychiatric History          Physical Exam  General:  Well nourished    Airway/Jaw/Neck:  Airway Findings: Mouth Opening: Normal Tongue: Normal  General Airway Assessment: Adult  Mallampati: II  TM Distance: Normal, at least 6 cm  Jaw/Neck Findings:  Neck ROM: Normal ROM      Dental:  Dental Findings: In tact   Chest/Lungs:  Chest/Lungs Clear    Heart/Vascular:  Heart Findings: Normal            Anesthesia Plan  Type of Anesthesia, risks & benefits discussed:  Anesthesia Type:  general  Patient's Preference:   Intra-op Monitoring Plan: standard ASA monitors  Intra-op Monitoring Plan Comments:   Post Op Pain Control Plan:   Post Op Pain Control Plan Comments:   Induction:   IV  Beta Blocker:  Patient is not currently on a Beta-Blocker (No further documentation required).       Informed Consent: Patient understands risks and agrees with Anesthesia plan.  Questions answered. Anesthesia consent signed with patient.  ASA Score: 2     Day of Surgery Review of History & Physical:    H&P update referred to the surgeon.         Ready For Surgery From Anesthesia Perspective.

## 2017-06-29 NOTE — ANESTHESIA POSTPROCEDURE EVALUATION
"Anesthesia Post Evaluation    Patient: Natalie Domingo    Procedure(s) Performed: Procedure(s) (LRB):  ARTHROSCOPY-MENISCECTOMY (Left)    Final Anesthesia Type: general  Patient location during evaluation: PACU  Patient participation: Yes- Able to Participate  Level of consciousness: awake and alert  Post-procedure vital signs: reviewed and stable  Pain management: adequate  Airway patency: patent  PONV status at discharge: No PONV  Anesthetic complications: no      Cardiovascular status: blood pressure returned to baseline  Respiratory status: unassisted  Hydration status: euvolemic  Follow-up not needed.        Visit Vitals  /78 (BP Location: Right arm, Patient Position: Lying, BP Method: Automatic)   Pulse 62   Temp 36.5 °C (97.7 °F) (Temporal)   Resp 18   Ht 5' 3.5" (1.613 m)   Wt 65.8 kg (145 lb)   SpO2 96%   BMI 25.28 kg/m²       Pain/Radha Score: Pain Assessment Performed: Yes (6/29/2017  1:30 PM)  Presence of Pain: denies (6/29/2017 12:33 PM)  Pain Rating Prior to Med Admin: 2 (6/29/2017  1:10 PM)  Pain Rating Post Med Admin: 2 (6/29/2017  1:15 PM)  Radha Score: 10 (6/29/2017  1:30 PM)      "

## 2017-06-30 ENCOUNTER — TELEPHONE (OUTPATIENT)
Dept: ORTHOPEDICS | Facility: CLINIC | Age: 62
End: 2017-06-30

## 2017-06-30 NOTE — TELEPHONE ENCOUNTER
Pt is post op day one, attempted to call [phone] and [phone] to check on patient.  No answer, no voicemail on either number.

## 2017-07-05 DIAGNOSIS — Z98.890 STATUS POST ARTHROSCOPIC SURGERY OF LEFT KNEE: Primary | ICD-10-CM

## 2017-07-10 ENCOUNTER — OFFICE VISIT (OUTPATIENT)
Dept: ORTHOPEDICS | Facility: CLINIC | Age: 62
End: 2017-07-10
Payer: COMMERCIAL

## 2017-07-10 VITALS
HEIGHT: 64 IN | DIASTOLIC BLOOD PRESSURE: 87 MMHG | WEIGHT: 145.06 LBS | HEART RATE: 73 BPM | BODY MASS INDEX: 24.77 KG/M2 | SYSTOLIC BLOOD PRESSURE: 131 MMHG

## 2017-07-10 DIAGNOSIS — Z98.890 STATUS POST ARTHROSCOPIC SURGERY OF LEFT KNEE: Primary | ICD-10-CM

## 2017-07-10 PROCEDURE — 99024 POSTOP FOLLOW-UP VISIT: CPT | Mod: S$GLB,,, | Performed by: ORTHOPAEDIC SURGERY

## 2017-07-10 NOTE — OP NOTE
Preoperative diagnosis: Left knee meniscus tear    Postoperative diagnosis: Left knee medial and lateral meniscus tear    Procedure: Left knee arthroscopic medial and lateral partial meniscectomies    Surgeon: Vinny Zarate    Asst: Colleen Lainezs: General    History: The patient is a 62-year-old female who presented to our office with a complaint of left knee pain.  Her imaging studies were consistent with a medial lateral meniscal pathology.  We expressed to her that I recommended course of action would be a arthroscopy with debridement.  The risk and benefits of surgical intervention including the potential for incomplete pain relief were explained to the patient who expressed that she understood and wished to proceed.  Informed consent was obtained.    After verifying informed consent and correct site and the patient was brought back to the operating room placed on the OR table in a supine position.  IV antibiotics were administered and a timeout was performed.  Closure was then prepped and draped in sterile fashion.  We began by creating a lateral portal.  Upon entering the knee with some minor fraying of the patella and trochlear groove along with a fissure in the trochlear groove.  The cartilage was stable to probing through a medial portal and so we elected to leave it alone.  We then proceeded into the lateral gutter which was clear.  We then proceeded to the medial compartment were found our medial meniscus tear.  Using a combination of karl and biters the meniscus was debrided down to a stable rim.  We then proceeded into the notch.  The anterior cruciate ligament was palpated and visualized and found to be normal.  We then proceeded to the lateral compartment where there was a tear of the anterior horn of the lateral meniscus.  We debrided this down to a stable rim taking down most of the anterior lateral meniscus.  There was some cartilage changes consistent with arthritis throughout the  lateral compartment.  We completed our debridement with an exit of the knee.  The portals were closed using 3-0 nylon.  A sterile dressing was applied along with a Ace bandage from foot to thigh.  The patient was then awoke from anesthesia extubated and brought to the PACU in good condition.    Total tourniquet time none    Drains none    Specimens: none    Complications none    Blood loss minimal    Postoperative plan: The patient will be discharged home later today and follow up with us in clinic in 2 weeks' time.  She is given complete postoperative instructions and pain medication.

## 2017-07-14 NOTE — OP NOTE
DATE OF PROCEDURE:  06/29/2017    PREOPERATIVE DIAGNOSIS:  Left knee lateral meniscus tear.    POSTOPERATIVE DIAGNOSIS:  Left knee lateral and medial meniscus tear.    PROCEDURE:  Left knee arthroscopic medial and lateral partial meniscectomy.    SURGEON:  Vinny Zarate M.D.    ASSISTANT:  Colleen Otoole.    ANESTHESIA:  General with local infiltrate.    HISTORY:  Ms. Domingo is a 62-year-old woman who presented to our office with a   complaint of knee pain.  Her imaging studies and examination were consistent   with a small medial meniscus tear and a larger complex lateral meniscus tear.    We discussed treatment options, since she elected to proceed with an arthroscopy   of the knee.  We expressed to her that we will do a cleanup of the meniscus   tears, but that she did have some underlying arthritis, which would continue to   give her some discomfort.  The risks and benefits of surgical intervention   including the potential for incomplete pain relief and the need for further   procedures were explained to the patient who expressed that she understood and   wished to proceed.  Informed consent was obtained.    PROCEDURE IN DETAIL:  After verifying informed consent and correct site, the   patient was brought back to the Operating Room, placed on the OR table in supine   position.  Preoperative IV antibiotics were administered and a timeout was   performed.  Left lower extremity was then prepped and draped in sterile fashion.    We began by creating a lateral portal.  Upon entering the knee, we noticed   some grade II cartilage changes of the patella.  There was some small fissuring   of the trochlear groove.  The lateral gutter was clear.  Proceeding into the   medial compartment, we found a tear of the medial meniscus at approximately the   10 o'clock position.  We then created a medial portal and did a cleanup of this   meniscus tear.  We then proceeded on into the notch and palpated the ACL, which   was found to  be normal.  We then proceeded into the lateral compartment.  There   was tearing of the anterior horn as expected that was quite significant.  There   was also small tearing of the posterior horn of the lateral meniscus.  Once   again using combination of karl and biters, the anterior and posterior   lateral meniscal tears were debrided down to the stable rim.  The entire   anterior lateral meniscus from approximately the 3 o'clock to 6 o'clock position   was removed due to the fact it had a complex tear that encompassed the entire   portion of the meniscus.  There was a small area on the lateral tibial plateau   of grade IV cartilage change.  The tear of the posterior lateral meniscus was   cleaned up with debridement.  At this point, we completed our cleanup of the   knee and the equipment was removed.  The portals were closed using 3-0 nylon.  A   sterile dressing was applied along with a PolarCare and an Ace bandage from   foot to thigh.  The patient was then awoken from anesthesia, extubated, and   brought to the PACU in good condition.    TOTAL TOURNIQUET TIME:  None.    DRAINS:  None.    SPECIMENS:  None.    COMPLICATIONS:  None.    ESTIMATED BLOOD LOSS:  Minimal.    POSTOPERATIVE PLAN:  The patient will be discharged home later this afternoon   and will follow up with us in clinic.  She has been given complete postop   instructions and pain medications.      OUSMANE/  dd: 06/29/2017 13:09:52 (CDT)  td: 07/14/2017 16:52:41 (CDT)  Doc ID   #[id number]  Job ID #[id number]    CC:

## 2017-07-14 NOTE — PROGRESS NOTES
Past Medical History:   Diagnosis Date    Attention deficit disorder without mention of hyperactivity     Breast cancer     Breast cancer 2010    left    Encounter for blood transfusion     Generalized osteoarthrosis, unspecified site     Hyperlipidemia     Hypertension     Migraine     Spinal stenosis        Past Surgical History:   Procedure Laterality Date    bladder lift  2016    BREAST SURGERY  2010    Double Mastectomy, Cancer    CARPAL TUNNEL RELEASE      CATARACT EXTRACTION, BILATERAL      EYE SURGERY      BILAT CATARACT    MASTECTOMY      double    SYMPATHECTOMY  1982 or 1983    TONSILLECTOMY         Current Outpatient Prescriptions   Medication Sig    aspirin 325 MG tablet Take 325 mg by mouth once daily.    benazepril (LOTENSIN) 20 MG tablet Take 1 tablet (20 mg total) by mouth once daily.    dextroamphetamine-amphetamine (ADDERALL) 20 mg tablet Take 1 tablet by mouth 2 (two) times daily as needed.    fluticasone (FLONASE) 50 mcg/actuation nasal spray 2 sprays by Each Nare route once daily. (Patient taking differently: 2 sprays by Each Nare route as needed. )    ibuprofen (ADVIL,MOTRIN) 600 MG tablet Take 1 tablet (600 mg total) by mouth 4 (four) times daily.    lidocaine (LIDODERM) 5 % Place 1 patch onto the skin once daily. Remove & Discard patch within 12 hours or as directed by MD (Patient taking differently: Place 1 patch onto the skin daily as needed. Remove & Discard patch within 12 hours or as directed by MD)    magnesium 250 mg Tab Take by mouth once daily.    multivitamin (ONE DAILY MULTIVITAMIN) per tablet Take 1 tablet by mouth once daily.    oxycodone-acetaminophen (PERCOCET) 5-325 mg per tablet Take 1-2 tablets by mouth every 6 (six) hours as needed for Pain.     No current facility-administered medications for this visit.        Review of patient's allergies indicates:   Allergen Reactions    Zocor [simvastatin] Rash       Family History   Problem Relation Age of  Onset    Heart disease Mother        Social History     Social History    Marital status:      Spouse name: N/A    Number of children: N/A    Years of education: N/A     Occupational History    Not on file.     Social History Main Topics    Smoking status: Current Every Day Smoker     Packs/day: 0.50     Years: 42.00     Types: Cigarettes    Smokeless tobacco: Never Used    Alcohol use Yes      Comment: social    Drug use: No    Sexual activity: Not on file     Other Topics Concern    Not on file     Social History Narrative    No narrative on file       Chief Complaint:   Chief Complaint   Patient presents with    Post-op Evaluation     S/P LEFT MEISCECTOMY 6/29/17       Consulting Physician: No ref. provider found    History of present illness: This is a 62 y.o. year old female following up for left knee menisectomy 6-29-17. Pain 2/10. Not using pain meds.      Review of Systems:    Constitution: Denies chills, fever, and sweats.    HENT: Denies headaches or blurry vision.    Cardiovascular: Denies chest pain or irregular heart beat.    Respiratory: Denies cough or shortness of breath.    Gastrointestinal: Denies abdominal pain, nausea, or vomiting.    Musculoskeletal:  Denies muscle cramps.    Neurological: Denies dizziness or focal weakness.    Psychiatric/Behavioral: Normal mental status.    Hematologic/Lymphatic: Denies bleeding problem or easy bruising/bleeding.    Skin: Denies rash or suspicious lesions.      Examination:    Vital Signs:    Vitals:    07/10/17 0836   BP: 131/87   Pulse: 73       Body mass index is 25.29 kg/m².    This a well-developed, well nourished patient in no acute distress.    Alert and oriented and cooperative to examination.       Physical Exam: Left knee    Inspection/Observation   Swelling:   mild  Erythema:   none  Bruising:   mild  Wounds:   Clean and dry  Drainage:  None    Range of Motion   full    Muscle Strength   4+    Other   Sensation:  normal  Pulse:     palpable    Imaging: Normal post-operative XR     Assessment: Status post arthroscopic surgery of left knee        Plan:  Doing well. Wants HEP. Back in 6 weeks.      DISCLAIMER: This note may have been dictated using voice recognition software and may contain grammatical errors. Report sent to referring provider as required.

## 2017-08-14 ENCOUNTER — OFFICE VISIT (OUTPATIENT)
Dept: ORTHOPEDICS | Facility: CLINIC | Age: 62
End: 2017-08-14
Payer: COMMERCIAL

## 2017-08-14 VITALS
WEIGHT: 149.06 LBS | BODY MASS INDEX: 25.45 KG/M2 | SYSTOLIC BLOOD PRESSURE: 155 MMHG | DIASTOLIC BLOOD PRESSURE: 103 MMHG | HEART RATE: 85 BPM | HEIGHT: 64 IN

## 2017-08-14 DIAGNOSIS — Z98.890 STATUS POST ARTHROSCOPIC SURGERY OF LEFT KNEE: Primary | ICD-10-CM

## 2017-08-14 PROCEDURE — 99024 POSTOP FOLLOW-UP VISIT: CPT | Mod: S$GLB,,, | Performed by: ORTHOPAEDIC SURGERY

## 2017-08-14 NOTE — PROGRESS NOTES
Past Medical History:   Diagnosis Date    Attention deficit disorder without mention of hyperactivity     Breast cancer     Breast cancer 2010    left    Encounter for blood transfusion     Generalized osteoarthrosis, unspecified site     Hyperlipidemia     Hypertension     Migraine     Spinal stenosis        Past Surgical History:   Procedure Laterality Date    bladder lift  2016    BREAST SURGERY  2010    Double Mastectomy, Cancer    CARPAL TUNNEL RELEASE      CATARACT EXTRACTION, BILATERAL      EYE SURGERY      BILAT CATARACT    MASTECTOMY      double    SYMPATHECTOMY  1982 or 1983    TONSILLECTOMY         Current Outpatient Prescriptions   Medication Sig    aspirin 325 MG tablet Take 325 mg by mouth once daily.    benazepril (LOTENSIN) 20 MG tablet Take 1 tablet (20 mg total) by mouth once daily.    dextroamphetamine-amphetamine (ADDERALL) 20 mg tablet Take 1 tablet by mouth 2 (two) times daily as needed.    fluticasone (FLONASE) 50 mcg/actuation nasal spray 2 sprays by Each Nare route once daily. (Patient taking differently: 2 sprays by Each Nare route as needed. )    ibuprofen (ADVIL,MOTRIN) 600 MG tablet Take 1 tablet (600 mg total) by mouth 4 (four) times daily.    lidocaine (LIDODERM) 5 % Place 1 patch onto the skin once daily. Remove & Discard patch within 12 hours or as directed by MD (Patient taking differently: Place 1 patch onto the skin daily as needed. Remove & Discard patch within 12 hours or as directed by MD)    magnesium 250 mg Tab Take by mouth once daily.    multivitamin (ONE DAILY MULTIVITAMIN) per tablet Take 1 tablet by mouth once daily.     No current facility-administered medications for this visit.        Review of patient's allergies indicates:   Allergen Reactions    Zocor [simvastatin] Rash       Family History   Problem Relation Age of Onset    Heart disease Mother        Social History     Social History    Marital status:      Spouse name: N/A     Number of children: N/A    Years of education: N/A     Occupational History    Not on file.     Social History Main Topics    Smoking status: Current Every Day Smoker     Packs/day: 0.50     Years: 42.00     Types: Cigarettes    Smokeless tobacco: Never Used    Alcohol use Yes      Comment: social    Drug use: No    Sexual activity: Not on file     Other Topics Concern    Not on file     Social History Narrative    No narrative on file       Chief Complaint:   Chief Complaint   Patient presents with    Post-op Evaluation     S/P Left knee menisectomy 6/29/17       Consulting Physician: No ref. provider found    History of present illness: This is a 62 y.o. year old female following up for left knee menisectomy 6-29-17. Pain 2/10. Not using pain meds. Doing exercise on own.      Review of Systems:    Constitution: Denies chills, fever, and sweats.    HENT: Denies headaches or blurry vision.    Cardiovascular: Denies chest pain or irregular heart beat.    Respiratory: Denies cough or shortness of breath.    Gastrointestinal: Denies abdominal pain, nausea, or vomiting.    Musculoskeletal:  Denies muscle cramps.    Neurological: Denies dizziness or focal weakness.    Psychiatric/Behavioral: Normal mental status.    Hematologic/Lymphatic: Denies bleeding problem or easy bruising/bleeding.    Skin: Denies rash or suspicious lesions.      Examination:    Vital Signs:    Vitals:    08/14/17 0833   BP: (!) 155/103   Pulse: 85       Body mass index is 25.99 kg/m².    This a well-developed, well nourished patient in no acute distress.    Alert and oriented and cooperative to examination.       Physical Exam: Left knee    Inspection/Observation   Swelling:   none  Erythema:   none  Bruising:   none  Wounds:   healed  Drainage:  None    Range of Motion   full    Muscle Strength   5    Other   Sensation:  normal  Pulse:    palpable    Imaging:      Assessment: Status post arthroscopic surgery of left knee        Plan:   Doing well. Continue HEP.      DISCLAIMER: This note may have been dictated using voice recognition software and may contain grammatical errors. Report sent to referring provider as required.

## 2017-09-26 PROBLEM — R20.2 TINGLING IN EXTREMITIES: Status: ACTIVE | Noted: 2017-09-26

## 2017-09-26 PROBLEM — M54.50 CHRONIC MIDLINE LOW BACK PAIN WITHOUT SCIATICA: Status: ACTIVE | Noted: 2017-09-26

## 2017-09-26 PROBLEM — M54.2 NECK PAIN, CHRONIC: Status: ACTIVE | Noted: 2017-09-26

## 2017-09-26 PROBLEM — G89.29 CHRONIC MIDLINE LOW BACK PAIN WITHOUT SCIATICA: Status: ACTIVE | Noted: 2017-09-26

## 2017-09-26 PROBLEM — M47.816 OSTEOARTHRITIS OF LUMBAR SPINE: Status: ACTIVE | Noted: 2017-09-26

## 2017-09-26 PROBLEM — G89.29 NECK PAIN, CHRONIC: Status: ACTIVE | Noted: 2017-09-26

## 2017-10-13 PROBLEM — M51.36 DDD (DEGENERATIVE DISC DISEASE), LUMBAR: Status: ACTIVE | Noted: 2017-10-13

## 2017-10-13 PROBLEM — M50.30 DDD (DEGENERATIVE DISC DISEASE), CERVICAL: Status: ACTIVE | Noted: 2017-10-13

## 2017-10-24 ENCOUNTER — TELEPHONE (OUTPATIENT)
Dept: ORTHOPEDICS | Facility: CLINIC | Age: 62
End: 2017-10-24

## 2017-10-24 NOTE — TELEPHONE ENCOUNTER
----- Message from Stacey M Lefort sent at 10/24/2017  3:20 PM CDT -----  Patient needs a neck surgery she is looking for a second opinion.  She understands that Dr. Cao does not see back and spine but she has used him for her knee this past summer and she would really like to get his opinion. She can be reached at [phone]. Thank you.

## 2017-10-24 NOTE — TELEPHONE ENCOUNTER
Returned pt's call, advised that Dr. Zarate does not practice back/spine and therefor would not be able to see her for second opinion of this.  She verbalized understanding.

## 2017-11-21 PROBLEM — M48.061 NEURAL FORAMINAL STENOSIS OF LUMBAR SPINE: Status: ACTIVE | Noted: 2017-11-21

## 2017-11-21 PROBLEM — M43.06 LUMBAR SPONDYLOLYSIS: Status: ACTIVE | Noted: 2017-11-21

## 2017-11-21 PROBLEM — M48.02 CERVICAL STENOSIS OF SPINAL CANAL: Status: ACTIVE | Noted: 2017-11-21

## 2017-11-21 PROBLEM — M51.26 HERNIATED LUMBAR INTERVERTEBRAL DISC: Status: ACTIVE | Noted: 2017-11-21

## 2017-11-21 PROBLEM — M43.12 SPONDYLOLISTHESIS OF CERVICAL REGION: Status: ACTIVE | Noted: 2017-11-21

## 2017-11-21 PROBLEM — M47.812 CERVICAL SPONDYLOSIS: Status: ACTIVE | Noted: 2017-11-21

## 2017-11-21 PROBLEM — M43.16 SPONDYLOLISTHESIS OF LUMBAR REGION: Status: ACTIVE | Noted: 2017-11-21

## 2017-11-21 PROBLEM — M19.012 BILATERAL SHOULDER REGION ARTHRITIS: Status: ACTIVE | Noted: 2017-11-21

## 2017-11-21 PROBLEM — G54.9 MYELORADICULOPATHY: Status: ACTIVE | Noted: 2017-11-21

## 2017-11-21 PROBLEM — M16.0 BILATERAL HIP JOINT ARTHRITIS: Status: ACTIVE | Noted: 2017-11-21

## 2017-11-21 PROBLEM — M19.011 BILATERAL SHOULDER REGION ARTHRITIS: Status: ACTIVE | Noted: 2017-11-21

## 2017-11-22 PROBLEM — M85.80 OSTEOPENIA: Status: ACTIVE | Noted: 2017-11-22

## 2017-12-14 ENCOUNTER — ANESTHESIA EVENT (OUTPATIENT)
Dept: SURGERY | Facility: AMBULARY SURGERY CENTER | Age: 62
End: 2017-12-14
Payer: COMMERCIAL

## 2017-12-15 ENCOUNTER — HOSPITAL ENCOUNTER (OUTPATIENT)
Facility: AMBULARY SURGERY CENTER | Age: 62
Discharge: HOME | End: 2017-12-15
Attending: SPECIALIST | Admitting: SPECIALIST
Payer: COMMERCIAL

## 2017-12-15 ENCOUNTER — SURGERY (OUTPATIENT)
Age: 62
End: 2017-12-15

## 2017-12-15 ENCOUNTER — ANESTHESIA (OUTPATIENT)
Dept: SURGERY | Facility: AMBULARY SURGERY CENTER | Age: 62
End: 2017-12-15
Payer: COMMERCIAL

## 2017-12-15 VITALS
RESPIRATION RATE: 18 BRPM | WEIGHT: 145 LBS | HEIGHT: 63 IN | BODY MASS INDEX: 25.69 KG/M2 | DIASTOLIC BLOOD PRESSURE: 60 MMHG | HEART RATE: 77 BPM | SYSTOLIC BLOOD PRESSURE: 128 MMHG | TEMPERATURE: 122 F | OXYGEN SATURATION: 97 %

## 2017-12-15 DIAGNOSIS — M51.26 HERNIATED LUMBAR INTERVERTEBRAL DISC: ICD-10-CM

## 2017-12-15 DIAGNOSIS — M43.06 LUMBAR SPONDYLOLYSIS: ICD-10-CM

## 2017-12-15 DIAGNOSIS — M48.00 SPINAL STENOSIS, UNSPECIFIED SPINAL REGION: Primary | ICD-10-CM

## 2017-12-15 DIAGNOSIS — M48.061 NEURAL FORAMINAL STENOSIS OF LUMBAR SPINE: ICD-10-CM

## 2017-12-15 DIAGNOSIS — M43.16 SPONDYLOLISTHESIS OF LUMBAR REGION: ICD-10-CM

## 2017-12-15 DIAGNOSIS — M47.816 LUMBAR SPONDYLOSIS: ICD-10-CM

## 2017-12-15 DIAGNOSIS — M51.36 DDD (DEGENERATIVE DISC DISEASE), LUMBAR: ICD-10-CM

## 2017-12-15 PROCEDURE — 64493 INJ PARAVERT F JNT L/S 1 LEV: CPT | Performed by: SPECIALIST

## 2017-12-15 PROCEDURE — D9220A PRA ANESTHESIA: Mod: ANES,,, | Performed by: ANESTHESIOLOGY

## 2017-12-15 PROCEDURE — 64494 INJ PARAVERT F JNT L/S 2 LEV: CPT | Performed by: SPECIALIST

## 2017-12-15 PROCEDURE — D9220A PRA ANESTHESIA: Mod: CRNA,,, | Performed by: NURSE ANESTHETIST, CERTIFIED REGISTERED

## 2017-12-15 RX ORDER — DEXAMETHASONE SODIUM PHOSPHATE 10 MG/ML
INJECTION INTRAMUSCULAR; INTRAVENOUS
Status: DISCONTINUED
Start: 2017-12-15 | End: 2017-12-15 | Stop reason: HOSPADM

## 2017-12-15 RX ORDER — PROPOFOL 10 MG/ML
INJECTION, EMULSION INTRAVENOUS
Status: COMPLETED
Start: 2017-12-15 | End: 2017-12-15

## 2017-12-15 RX ORDER — BUPIVACAINE HYDROCHLORIDE 2.5 MG/ML
INJECTION, SOLUTION EPIDURAL; INFILTRATION; INTRACAUDAL
Status: DISCONTINUED | OUTPATIENT
Start: 2017-12-15 | End: 2017-12-15 | Stop reason: HOSPADM

## 2017-12-15 RX ORDER — PROPOFOL 10 MG/ML
INJECTION, EMULSION INTRAVENOUS
Status: DISCONTINUED
Start: 2017-12-15 | End: 2017-12-15 | Stop reason: HOSPADM

## 2017-12-15 RX ORDER — LIDOCAINE HYDROCHLORIDE 10 MG/ML
1 INJECTION, SOLUTION EPIDURAL; INFILTRATION; INTRACAUDAL; PERINEURAL ONCE
Status: COMPLETED | OUTPATIENT
Start: 2017-12-15 | End: 2017-12-15

## 2017-12-15 RX ORDER — SODIUM CHLORIDE, SODIUM LACTATE, POTASSIUM CHLORIDE, CALCIUM CHLORIDE 600; 310; 30; 20 MG/100ML; MG/100ML; MG/100ML; MG/100ML
INJECTION, SOLUTION INTRAVENOUS CONTINUOUS
Status: DISCONTINUED | OUTPATIENT
Start: 2017-12-15 | End: 2017-12-15 | Stop reason: HOSPADM

## 2017-12-15 RX ORDER — DEXAMETHASONE SODIUM PHOSPHATE 10 MG/ML
INJECTION INTRAMUSCULAR; INTRAVENOUS
Status: DISCONTINUED | OUTPATIENT
Start: 2017-12-15 | End: 2017-12-15 | Stop reason: HOSPADM

## 2017-12-15 RX ORDER — LIDOCAINE HYDROCHLORIDE 20 MG/ML
INJECTION, SOLUTION EPIDURAL; INFILTRATION; INTRACAUDAL; PERINEURAL
Status: DISCONTINUED
Start: 2017-12-15 | End: 2017-12-15 | Stop reason: HOSPADM

## 2017-12-15 RX ORDER — LIDOCAINE HCL/PF 100 MG/5ML
SYRINGE (ML) INTRAVENOUS
Status: DISCONTINUED | OUTPATIENT
Start: 2017-12-15 | End: 2017-12-15

## 2017-12-15 RX ORDER — ONDANSETRON 2 MG/ML
4 INJECTION INTRAMUSCULAR; INTRAVENOUS ONCE AS NEEDED
Status: CANCELLED | OUTPATIENT
Start: 2017-12-15 | End: 2017-12-15

## 2017-12-15 RX ORDER — SODIUM CHLORIDE, SODIUM LACTATE, POTASSIUM CHLORIDE, CALCIUM CHLORIDE 600; 310; 30; 20 MG/100ML; MG/100ML; MG/100ML; MG/100ML
1000 INJECTION, SOLUTION INTRAVENOUS ONCE
Status: CANCELLED | OUTPATIENT
Start: 2017-12-15 | End: 2017-12-15

## 2017-12-15 RX ORDER — BUPIVACAINE HYDROCHLORIDE 2.5 MG/ML
INJECTION, SOLUTION EPIDURAL; INFILTRATION; INTRACAUDAL
Status: DISCONTINUED
Start: 2017-12-15 | End: 2017-12-15 | Stop reason: HOSPADM

## 2017-12-15 RX ORDER — PROPOFOL 10 MG/ML
VIAL (ML) INTRAVENOUS
Status: DISCONTINUED | OUTPATIENT
Start: 2017-12-15 | End: 2017-12-15

## 2017-12-15 RX ADMIN — BUPIVACAINE HYDROCHLORIDE 10 ML: 2.5 INJECTION, SOLUTION EPIDURAL; INFILTRATION; INTRACAUDAL at 10:12

## 2017-12-15 RX ADMIN — LIDOCAINE HYDROCHLORIDE 0.25 MG: 10 INJECTION, SOLUTION EPIDURAL; INFILTRATION; INTRACAUDAL; PERINEURAL at 09:12

## 2017-12-15 RX ADMIN — Medication 50 MG: at 10:12

## 2017-12-15 RX ADMIN — SODIUM CHLORIDE, SODIUM LACTATE, POTASSIUM CHLORIDE, CALCIUM CHLORIDE: 600; 310; 30; 20 INJECTION, SOLUTION INTRAVENOUS at 09:12

## 2017-12-15 RX ADMIN — DEXAMETHASONE SODIUM PHOSPHATE 10 MG: 10 INJECTION INTRAMUSCULAR; INTRAVENOUS at 10:12

## 2017-12-15 RX ADMIN — Medication 75 MG: at 10:12

## 2017-12-15 RX ADMIN — BUPIVACAINE HYDROCHLORIDE 9 ML: 2.5 INJECTION, SOLUTION EPIDURAL; INFILTRATION; INTRACAUDAL at 10:12

## 2017-12-15 RX ADMIN — Medication 30 MG: at 10:12

## 2017-12-15 NOTE — DISCHARGE SUMMARY
[facility]  Discharge Note  Short Stay    Admit Date: 12/15/2017    Discharge Date and Time: No discharge date for patient encounter.     Attending Physician: Raffi Fernandez Jr., MD     Discharge Provider: Raffi Fernandez Jr    Diagnoses:  Active Hospital Problems    Diagnosis  POA    *Lumbar spondylosis [M47.816]  Yes      Resolved Hospital Problems    Diagnosis Date Resolved POA   No resolved problems to display.       Discharged Condition: good    Hospital Course: Patient was admitted for an outpatient procedure and tolerated the procedure well with no complications.    Final Diagnoses: Same as principal problem.    Disposition: Home or Self Care    Follow up/Patient Instructions:    Medications:  Reconciled Home Medications:   Current Discharge Medication List      CONTINUE these medications which have NOT CHANGED    Details   aspirin 325 MG tablet Take 325 mg by mouth once daily.      baclofen (LIORESAL) 10 MG tablet Take 1 tablet (10 mg total) by mouth 3 (three) times daily as needed.  Qty: 30 tablet, Refills: 0    Associated Diagnoses: History of spinal stenosis; Osteoarthritis of lumbar spine, unspecified spinal osteoarthritis complication status; Anterolisthesis; Facet arthropathy, lumbar; Chronic midline low back pain without sciatica; Neck pain, chronic; Neck stiffness; Protruded cervical disc; DDD (degenerative disc disease), cervical; DDD (degenerative disc disease), lumbar; Protruded lumbar disc      benazepril-hydrochlorthiazide (LOTENSIN HCT) 20-12.5 mg per tablet Take 1 tablet by mouth once daily.  Qty: 90 tablet, Refills: 3    Associated Diagnoses: Essential hypertension      dextroamphetamine-amphetamine (ADDERALL) 20 mg tablet Take 1 tablet by mouth 2 (two) times daily as needed.  Qty: 60 tablet, Refills: 0    Associated Diagnoses: ADD (attention deficit disorder) without hyperactivity; Protruded cervical disc; DDD (degenerative disc disease), cervical; DDD (degenerative disc  disease), lumbar; Protruded lumbar disc      fluticasone (FLONASE) 50 mcg/actuation nasal spray 2 sprays by Each Nare route once daily.  Qty: 16 g, Refills: 1    Associated Diagnoses: Nasal congestion; Loss of sense of smell      ibuprofen (ADVIL,MOTRIN) 600 MG tablet Take 1 tablet (600 mg total) by mouth 4 (four) times daily.  Qty: 60 tablet, Refills: 0      lidocaine (LIDODERM) 5 % Place 1 patch onto the skin once daily. Remove & Discard patch within 12 hours or as directed by MD  Qty: 30 patch, Refills: 3      magnesium 250 mg Tab Take by mouth once daily.      multivitamin (ONE DAILY MULTIVITAMIN) per tablet Take 1 tablet by mouth once daily.      tobramycin sulfate 0.3% (TOBREX) 0.3 % ophthalmic solution Place 1 drop into the left eye every 4 (four) hours.  Qty: 5 mL, Refills: 0    Associated Diagnoses: Acute conjunctivitis of left eye, unspecified acute conjunctivitis type             Discharge Procedure Orders  Diet general     Activity as tolerated     Remove dressing in 24 hours       Follow-up Information     Raffi Fernandez Jr, MD In 2 weeks.    Specialty:  Orthopedic Surgery  Contact information:  [address]  [address]  [facility] [address]  [phone]                   Discharge Procedure Orders (must include Diet, Follow-up, Activity):    Discharge Procedure Orders (must include Diet, Follow-up, Activity)  Diet general     Activity as tolerated     Remove dressing in 24 hours

## 2017-12-15 NOTE — ANESTHESIA POSTPROCEDURE EVALUATION
"Anesthesia Post Evaluation    Patient: Natalie Domingo    Procedure(s) Performed: Procedure(s) (LRB):  BLOCK-FACET-LUMBAR- [place] L3/4 4/5 5/S1 (Bilateral)    Final Anesthesia Type: general  Patient location during evaluation: PACU  Patient participation: Yes- Able to Participate  Level of consciousness: awake and alert  Post-procedure vital signs: reviewed and stable  Pain management: adequate  Airway patency: patent  PONV status at discharge: No PONV  Anesthetic complications: no      Cardiovascular status: hemodynamically stable  Respiratory status: unassisted and room air  Hydration status: euvolemic  Follow-up not needed.        Visit Vitals  /79 (BP Location: Right arm, Patient Position: Lying)   Pulse 66   Temp 36.6 °C (97.9 °F)   Resp 18   Ht 5' 3" (1.6 m)   Wt 65.8 kg (145 lb)   SpO2 99%   Breastfeeding? No   BMI 25.69 kg/m²       Pain/Radha Score: Pain Assessment Performed: Yes (12/15/2017  9:07 AM)  Presence of Pain: complains of pain/discomfort (12/15/2017  9:07 AM)      "

## 2017-12-15 NOTE — OP NOTE
DATE OF PROCEDURE:  12/15/2017    SURGEON NAME: DR. BOZENA HOOK    PREOPERATIVE DIAGNOSIS  Lumbar spondylosis    POSTOPERATIVE DIAGNOSIS  Lumbar spondylosis    PROCEDURE  Left Lumbar Facet Steroid and Marcaine injections at levels L4/5, L5/S1  Intraoperative fluoroscopy.    ANESTHESIA  Monitored anesthesia care.    ESTIMATED BLOOD LOSS  Less than 1 mL.    INDICATION FOR SURGERY    Patient presents with complaints of back mechanical pain. The patients pain continued to persist, interfering with their quality of life and activities of daily living. A surgical procedure was planned with steroid and Marcaine facet injections. The patient was informed that the surgical procedure provides no guarantee of improvement or worsening of present condition but only an attempt to improve overall condition and function. The patient expressed understanding, and all questions were answered and still desired to proceed with operative procedure.      PROCEDURE IN DETAIL    The patient was taken to the operating room. Anesthesia achieved with monitored anesthesia care.  After adequate anesthesia was achieved, the patient was placed in prone position. All bony prominences were well padded and protected. The lower back prepped and draped in the usual sterile fashion. Approximately 2ml per level 0.25% Marcaine utilized local into skin and subcutaneous tissue at each level. With assistance of intraoperative fluoroscopy, a 22-gauge spinal needle was advanced into the facet joints. A mixture of ml of 0.25% Marcaine, and 1ml of 10mg Decadron was made where approximately 1 to 1.5 ml advanced into each facet joint. At the completion of steroid injections, the needle was slowly withdrawn. The surgical sites were cleaned with normal saline with application of sterile occlusive Band-Aids. Anesthesia reversed. The patient transferred to recovery room in stable condition without immediate apparent surgical complication.

## 2017-12-15 NOTE — ANESTHESIA PREPROCEDURE EVALUATION
12/15/2017  Natalie Domingo is a 62 y.o., female.    Pre-op Assessment    I have reviewed the Patient Summary Reports.     I have reviewed the Nursing Notes.   I have reviewed the Medications.     Review of Systems  Anesthesia Hx:  No problems with previous Anesthesia  Denies Family Hx of Anesthesia complications.   Denies Personal Hx of Anesthesia complications.   Social:  Smoker    Hematology/Oncology:  Hematology Normal       -- Cancer in past history:  Breast surgery    Cardiovascular:   Hypertension hyperlipidemia    Pulmonary:  Pulmonary Normal    Renal/:  Renal/ Normal     Hepatic/GI:  Hepatic/GI Normal    Musculoskeletal:   Arthritis     Neurological:   Headaches    Endocrine:  Endocrine Normal    Psych:   Psychiatric History          Physical Exam  General:  Well nourished    Airway/Jaw/Neck:  Airway Findings: Mouth Opening: Normal Tongue: Normal  General Airway Assessment: Adult  Mallampati: II  TM Distance: Normal, at least 6 cm  Jaw/Neck Findings:  Neck ROM: Normal ROM      Dental:  Dental Findings: In tact   Chest/Lungs:  Chest/Lungs Clear    Heart/Vascular:  Heart Findings: Normal            Anesthesia Plan  Type of Anesthesia, risks & benefits discussed:  Anesthesia Type:  general  Patient's Preference:   Intra-op Monitoring Plan: standard ASA monitors  Intra-op Monitoring Plan Comments:   Post Op Pain Control Plan:   Post Op Pain Control Plan Comments:   Induction:   IV  Beta Blocker:  Patient is not currently on a Beta-Blocker (No further documentation required).       Informed Consent: Patient understands risks and agrees with Anesthesia plan.  Questions answered. Anesthesia consent signed with patient.  ASA Score: 2     Day of Surgery Review of History & Physical: I have interviewed and examined the patient. I have reviewed the patient's H&P dated:  There are no significant  changes.  H&P update referred to the surgeon.         Ready For Surgery From Anesthesia Perspective.

## 2017-12-15 NOTE — TRANSFER OF CARE
"Anesthesia Transfer of Care Note    Patient: Natalie Domingo    Procedure(s) Performed: Procedure(s) (LRB):  BLOCK-FACET-LUMBAR- [place] L3/4 4/5 5/S1 (Bilateral)    Patient location: PACU    Anesthesia Type: general    Transport from OR: Transported from OR on room air with adequate spontaneous ventilation    Post pain: adequate analgesia    Post assessment: no apparent anesthetic complications    Post vital signs: stable    Level of consciousness: awake, alert and oriented    Nausea/Vomiting: no nausea/vomiting    Complications: none    Transfer of care protocol was followed      Last vitals:   Visit Vitals  /79 (BP Location: Right arm, Patient Position: Lying)   Pulse 66   Temp 36.4 °C (97.6 °F) (Oral)   Resp 18   Ht 5' 3" (1.6 m)   Wt 65.8 kg (145 lb)   SpO2 99%   Breastfeeding? No   BMI 25.69 kg/m²     "

## 2017-12-19 PROBLEM — M85.859 OSTEOPENIA OF FEMORAL NECK: Status: ACTIVE | Noted: 2017-11-22

## 2018-04-17 PROBLEM — J34.89 LESION OF NOSTRIL: Status: RESOLVED | Noted: 2017-03-14 | Resolved: 2018-04-17

## 2019-02-13 ENCOUNTER — TELEPHONE (OUTPATIENT)
Dept: SURGERY | Facility: CLINIC | Age: 64
End: 2019-02-13

## 2019-02-13 NOTE — TELEPHONE ENCOUNTER
----- Message from Chad Santoyo sent at 2/13/2019  2:31 PM CST -----  Contact: same  Patient called in and stated she was referred by Dr. Nagy (referral in system) to schedule a colonoscopy.  Patient would like a call back at [phone]

## 2019-02-13 NOTE — TELEPHONE ENCOUNTER
Phoned pt regarding referral from Dr. Nagy for colonoscopy.  Scheduled pt 3-19-19 to see Dr. Harris.

## 2019-03-18 ENCOUNTER — OFFICE VISIT (OUTPATIENT)
Dept: SURGERY | Facility: CLINIC | Age: 64
End: 2019-03-18
Payer: COMMERCIAL

## 2019-03-18 VITALS
HEIGHT: 63 IN | DIASTOLIC BLOOD PRESSURE: 92 MMHG | RESPIRATION RATE: 16 BRPM | BODY MASS INDEX: 25.52 KG/M2 | TEMPERATURE: 98 F | OXYGEN SATURATION: 94 % | WEIGHT: 144 LBS | HEART RATE: 82 BPM | SYSTOLIC BLOOD PRESSURE: 147 MMHG

## 2019-03-18 DIAGNOSIS — G89.29 OTHER CHRONIC PAIN: ICD-10-CM

## 2019-03-18 DIAGNOSIS — E78.5 HYPERLIPIDEMIA, UNSPECIFIED HYPERLIPIDEMIA TYPE: ICD-10-CM

## 2019-03-18 DIAGNOSIS — F98.8 ADD (ATTENTION DEFICIT DISORDER) WITHOUT HYPERACTIVITY: ICD-10-CM

## 2019-03-18 DIAGNOSIS — I10 ESSENTIAL HYPERTENSION: ICD-10-CM

## 2019-03-18 DIAGNOSIS — Z12.11 ENCOUNTER FOR SCREENING COLONOSCOPY: Primary | ICD-10-CM

## 2019-03-18 DIAGNOSIS — K57.30 DIVERTICULOSIS OF COLON: ICD-10-CM

## 2019-03-18 PROCEDURE — 3077F SYST BP >= 140 MM HG: CPT | Mod: S$GLB,,, | Performed by: SURGERY

## 2019-03-18 PROCEDURE — 3080F DIAST BP >= 90 MM HG: CPT | Mod: S$GLB,,, | Performed by: SURGERY

## 2019-03-18 PROCEDURE — 99203 PR OFFICE/OUTPT VISIT, NEW, LEVL III, 30-44 MIN: ICD-10-PCS | Mod: S$GLB,,, | Performed by: SURGERY

## 2019-03-18 PROCEDURE — 3008F PR BODY MASS INDEX (BMI) DOCUMENTED: ICD-10-PCS | Mod: S$GLB,,, | Performed by: SURGERY

## 2019-03-18 PROCEDURE — 3080F PR MOST RECENT DIASTOLIC BLOOD PRESSURE >= 90 MM HG: ICD-10-PCS | Mod: S$GLB,,, | Performed by: SURGERY

## 2019-03-18 PROCEDURE — 99203 OFFICE O/P NEW LOW 30 MIN: CPT | Mod: S$GLB,,, | Performed by: SURGERY

## 2019-03-18 PROCEDURE — 3077F PR MOST RECENT SYSTOLIC BLOOD PRESSURE >= 140 MM HG: ICD-10-PCS | Mod: S$GLB,,, | Performed by: SURGERY

## 2019-03-18 PROCEDURE — 3008F BODY MASS INDEX DOCD: CPT | Mod: S$GLB,,, | Performed by: SURGERY

## 2019-03-18 RX ORDER — SODIUM CHLORIDE, SODIUM LACTATE, POTASSIUM CHLORIDE, CALCIUM CHLORIDE 600; 310; 30; 20 MG/100ML; MG/100ML; MG/100ML; MG/100ML
INJECTION, SOLUTION INTRAVENOUS CONTINUOUS
Status: CANCELLED | OUTPATIENT
Start: 2019-03-18

## 2019-03-18 RX ORDER — SODIUM, POTASSIUM,MAG SULFATES 17.5-3.13G
SOLUTION, RECONSTITUTED, ORAL ORAL
Qty: 2 BOTTLE | Refills: 0 | Status: ON HOLD | OUTPATIENT
Start: 2019-03-18 | End: 2019-03-29 | Stop reason: HOSPADM

## 2019-03-18 RX ORDER — LIDOCAINE HYDROCHLORIDE 10 MG/ML
1 INJECTION, SOLUTION EPIDURAL; INFILTRATION; INTRACAUDAL; PERINEURAL ONCE
Status: CANCELLED | OUTPATIENT
Start: 2019-03-18 | End: 2019-03-18

## 2019-03-18 NOTE — H&P
"[facility] - General Surgery  General Surgery  H&P      Patient Name: Natalie Domingo  MRN: [id number]     Chief Complaint: "I am having a colonoscopy"    HPI:  Ms. Domingo is seen today in consultation from Dr. Nagy for colon cancer screening. She has no complaints. No abdominal pain, nausea, vomiting, diarrhea, constipation, melena, hematochezia, change in bowel habits, change in stool characteristics, weight loss, decrease in caliber of stool, etc. No family history of colon cancer. No aggravating or alleviating factors. No other associated symptoms.  Colonoscopy in 2008 with a sigmoid polyp that was ablated and not biopsied.  Diverticulosis was also noted in the sigmoid colon.    Colonoscopy report from 5/29/2008 was reviewed, above findings confirmed.    Lab results were reviewed from 1/2/2019.  CBC showed no evidence of leukocytosis, anemia, or thrombocytopenia.  Electrolytes were all in the range of normal.  BUN and creatinine showed no evidence of renal dysfunction.  Glucose showed no suspicion diabetes.  Liver profile showed no evidence of hepatocellular disease or biliary obstruction. Hemoglobin A1c also showed no evidence of diabetes, 5.0%.  TSH and free T4 indicated she was euthyroid.  Lipid profile on 2/6/2019 showed hypercholesterolemia with a slightly elevated LDL but her HDL was favorable.  Triglyceride level was also favorable.    EKG reviewed from 2/6/2019.  Tracing showed a normal sinus rhythm with no evidence of any ischemic changes.        Allergies & Meds:     Allergen Reactions    Zocor [simvastatin] Rash       Current Outpatient Medications   Medication Sig Dispense Refill    amLODIPine (NORVASC) 5 MG tablet Take 1 tablet (5 mg total) by mouth once daily. In the afternoon 90 tablet 03    aspirin 325 MG tablet Take 325 mg by mouth once daily.      azelastine (ASTELIN) 137 mcg (0.1 %) nasal spray 1 spray (137 mcg total) by Nasal route 2 (two) times daily. 30 mL 2 "    benazepril (LOTENSIN) 20 MG tablet Take 1 tablet (20 mg total) by mouth once daily. 90 tablet 3    dextroamphetamine-amphetamine (ADDERALL) 20 mg tablet Take 1 tablet by mouth 2 (two) times daily as needed. 60 tablet 0    ibandronate (BONIVA) 150 mg tablet Take 1 tablet (150 mg total) by mouth every 30 days. 3 tablet 3    ibuprofen (ADVIL,MOTRIN) 600 MG tablet Take 1 tablet (600 mg total) by mouth 4 (four) times daily. 60 tablet 0    magnesium 250 mg Tab Take by mouth once daily.      multivitamin (ONE DAILY MULTIVITAMIN) per tablet Take 1 tablet by mouth once daily.      tiZANidine (ZANAFLEX) 4 MG tablet Take 4 mg by mouth every 6 (six) hours as needed.      sodium,potassium,mag sulfates (SUPREP BOWEL PREP KIT) 17.5-3.13-1.6 gram SolR Follow written instructions provided by Clinic 2 Bottle 0         PMFSHx:  Past Medical History:   Diagnosis Date    Attention deficit disorder without mention of hyperactivity     Breast cancer     Breast cancer 2010    left    Encounter for blood transfusion     Generalized osteoarthrosis, unspecified site     Hyperlipidemia     Hypertension     Migraine     Spinal stenosis        Past Surgical History:   Procedure Laterality Date    ARTHROSCOPY-MENISCECTOMY Left 6/29/2017    [facility]    bladder lift  2016    BLOCK-FACET-LUMBAR- [place] L3/4 4/5 5/S1 Bilateral 12/15/2017    [facility] OR    BREAST SURGERY  2010    Double Mastectomy, Cancer    CARPAL TUNNEL RELEASE      CATARACT EXTRACTION, BILATERAL      CYSTOSCOPY N/A 4/27/2016    [facility]    CYSTOSCOPY N/A 4/11/2016    [facility]    EYE SURGERY      BILAT CATARACT    KNEE SURGERY      MASTECTOMY      double    RETROPUBIC SLING N/A 4/27/2016    [facility]    SYMPATHECTOMY  1982 or 1983    TONSILLECTOMY          Family History   Problem Relation Age of Onset    Heart disease Mother        Social History     Tobacco Use    Smoking status: Current Every Day Smoker     Packs/day: 0.50     Years: 42.00     Pack years: 21.00     Types: Cigarettes    Smokeless tobacco: Never Used   Substance Use Topics    Alcohol use: Yes     Comment: social    Drug use: No       Review of Systems   Constitutional: Negative for appetite change, chills, fatigue, fever and unexpected weight change.   HENT: Negative for congestion, dental problem, ear pain, mouth sores, postnasal drip, rhinorrhea, sore throat, tinnitus, trouble swallowing and voice change.    Eyes: Negative for photophobia, pain, discharge and visual disturbance.   Respiratory: Negative for cough, chest tightness, shortness of breath and wheezing.    Cardiovascular: Negative for chest pain, palpitations and leg swelling.   Gastrointestinal: Negative for abdominal pain, blood in stool, constipation, diarrhea, nausea and vomiting.   Endocrine: Negative for cold intolerance, heat intolerance, polydipsia, polyphagia and polyuria.   Genitourinary: Negative for difficulty urinating, dysuria, flank pain, frequency, hematuria and urgency.   Musculoskeletal: Negative for arthralgias, joint swelling and myalgias.   Skin: Negative for color change and rash.   Allergic/Immunologic: Negative for immunocompromised state.   Neurological: Negative for dizziness, tremors, seizures, syncope, speech difficulty, weakness, numbness and headaches.   Hematological: Negative for adenopathy. Does not bruise/bleed easily.   Psychiatric/Behavioral: Negative for agitation, confusion, hallucinations, self-injury and suicidal ideas. The patient is not nervous/anxious.           Physical Exam   Constitutional: She is oriented to person, place, and time. She appears well-developed and well-nourished. She is active.  Non-toxic appearance. No distress. Body mass index is 25.51 kg/m².   HENT: Head:  Normocephalic and atraumatic.   Right Ear: Hearing and external ear normal. No drainage or tenderness.   Left Ear: Hearing and external ear normal. No drainage or tenderness.   Nose: Nose normal. No rhinorrhea. No epistaxis.   Mouth/Throat: Uvula is midline, oropharynx is clear and moist and mucous membranes are normal. Mucous membranes are not pale, not dry and not cyanotic. No oropharyngeal exudate.   Eyes: Conjunctivae and lids are normal. Pupils are equal, round, and reactive to light. Right eye exhibits no discharge and no exudate. Left eye exhibits no discharge and no exudate. Right conjunctiva is not injected. Right eye exhibits no nystagmus. Left eye exhibits no nystagmus.   Neck: Trachea normal, full passive range of motion without pain and phonation normal. No JVD present. Carotid bruit is not present. No tracheal deviation present. No thyroid mass and no thyromegaly present.   Cardiovascular: Normal rate, regular rhythm, S1 normal, S2 normal, normal heart sounds and intact distal pulses. PMI is not displaced. Exam reveals no gallop and no friction rub.   No murmur heard.  Pulmonary/Chest: Effort normal and breath sounds normal. No accessory muscle usage. No respiratory distress. She exhibits no mass, no tenderness and no crepitus. Right breast exhibits no skin change. Left breast exhibits no skin change.   healed bilateral modified radical mastectomies   Abdominal: Soft. Normal appearance and bowel sounds are normal. She exhibits no distension, no fluid wave, no abdominal bruit and no mass. There is no hepatosplenomegaly. There is no tenderness. There is no rebound, no guarding and negative Art's sign. No hernia. Hernia confirmed negative in the right inguinal area and confirmed negative in the left inguinal area.   Genitourinary: Rectum normal and vagina normal. There is no rash or lesion on the right labia. There is no rash or lesion on the left labia. Right adnexum displays no mass. Left adnexum  displays no mass. No vaginal discharge found.   Musculoskeletal:        Cervical back: Normal.        Thoracic back: Normal.        Lumbar back: Normal.        Right upper arm: Normal.        Left upper arm: Normal.        Right forearm: Normal.        Left forearm: Normal.        Right hand: Normal.        Left hand: Normal.        Right upper leg: Normal.        Left upper leg: Normal.        Right lower leg: Normal.        Left lower leg: Normal.        Right foot: Normal.        Left foot: Normal.   Lymphadenopathy:        Head (right side): No submental, no submandibular and no posterior auricular adenopathy present.        Head (left side): No submental, no submandibular and no posterior auricular adenopathy present.     She has no cervical adenopathy.     She has no axillary adenopathy.        Right: No inguinal and no supraclavicular adenopathy present.        Left: No inguinal and no supraclavicular adenopathy present.   Neurological: She is alert and oriented to person, place, and time. She has normal strength. No cranial nerve deficit or sensory deficit. Coordination and gait normal. GCS eye subscore is 4. GCS verbal subscore is 5. GCS motor subscore is 6.   Skin: Skin is warm, dry and intact. No rash noted. No cyanosis. Nails show no clubbing.   Psychiatric: She has a normal mood and affect. Her speech is normal. Judgment and thought content normal. Her mood appears not anxious. Her affect is not inappropriate. She is not actively hallucinating. She does not exhibit a depressed mood.         Test Results:  See above    Assessment:       Due for screening colonoscopy.  Differential diagnosis includes but not limited to colorectal cancer, diverticulosis, hemorrhoids, angiodysplasias, inflammatory bowel disease, etc.  Options include endoscopy, radiologic studies, second opinion, empiric management, observation, capsule endoscopy, etc.  Previous sigmoid polyp, ablated.  Diverticulosis of sigmoid colon.   Multiple comorbid issues ( hypertension, osteoporosis, chronic pain, ADD, breast cancer survivor ) increase the complexity of required perioperative evaluation & management, risks of complications, and likely will increase the difficulty and complexity of postoperative care, etc.  These issues must be factored in to preoperative evaluation and perioperative management.    1. Encounter for screening colonoscopy    2. Diverticulosis of colon    3. Other chronic pain    4. Essential hypertension    5. Hyperlipidemia, unspecified hyperlipidemia type    6. ADD (attention deficit disorder) without hyperactivity        Plan:   Encounter for screening colonoscopy  -     Case Request Operating Room: COLONOSCOPY    Diverticulosis of colon    Other chronic pain    Essential hypertension    Hyperlipidemia, unspecified hyperlipidemia type    ADD (attention deficit disorder) without hyperactivity    Other orders  -     sodium,potassium,mag sulfates (SUPREP BOWEL PREP KIT) 17.5-3.13-1.6 gram SolR; Follow written instructions provided by Clinic  Dispense: 2 Bottle; Refill: 0  -     Full code; Standing  -     Place in Outpatient; Standing  -     Vital signs; Standing  -     Insert peripheral IV; Standing  -     lidocaine (PF) 10 mg/ml (1%) injection 10 mg  -     Verify beta-blocker dose taken within 24 hours if patient is prescribed beta-blocker; Standing  -     Height and weight; Standing  -     Verify discontinuation of antithrombotics; Standing  -     Verify blood consent; Standing  -     Verify consent; Standing  -     Diet NPO; Standing  -     lactated ringers infusion  -     IP VTE LOW RISK PATIENT; Standing        Follow-up around 4/18/2019 for routine post-endosocpy visit.    Counseling/Medical Decision Making:  Natalie Domingo was counseled regarding the results of the evaluation thus far and the differential diagnosis.  Diagnostic and therapeutic options were discussed in detail along with the risks, benefits,  possible complications, details of, and indications for each option.  Diagnoses discussed included but were not limited to: hemorrhoids, diverticulosis, cancer, IBD, IBS, benign tumors, angiodysplasias.  Options discussed included but were not limited to: colonoscopy, proctoscopy, anoscopy, sigmoidoscopy, radiologic studies, PillCam, empiric therapy, second opinion, etc. Possible complications of colonoscopy discussed included but were not limited to: bleeding, infections, endocarditis, injury to internal organs, incomplete exam, failure to diagnose cancer or other serious condition, need for emergency surgery, need for a temporary colostomy, need for additional operations or procedures, etc.  Entire conversation was held in layman's terms.  Questions solicited and answered.  I read the consent form to her verbatim.  All unfamiliar terms were defined.  Krames booklets were provided on colonoscopy, polyps, diverticulosis, hemorrhoids, cancer, etc.  A copy of the consent form was provided as well for her review outside the office / hospital prior to the procedure.  At the conclusion of the conversation she voiced complete understanding of all we discussed and satisfaction that all of her questions had been answered to her full understanding.  She stated that she felt fully informed and totally capable of making an informed decision.  She desires and requests to proceed with colonoscopy.

## 2019-03-18 NOTE — LETTER
March 18, 2019      Fartun Nagy III, MD  [address]  [facility] [place] [address]           [facility] - General Surgery  [address] [facility] [place] [address]  Phone: [phone]  Fax: [phone]          Patient: Natalie Domingo   MR Number: [id number]   YOB: 1955   Date of Visit: 3/18/2019       Dear Dr. Fartun Nagy III:    Thank you for referring Natalie Domingo to me for evaluation. Attached you will find relevant portions of my assessment and plan of care.    If you have questions, please do not hesitate to call me. I look forward to following Natalie Domingo along with you.    Sincerely,    Hans Harris MD    Enclosure  CC:  No Recipients    If you would like to receive this communication electronically, please contact [email or web address] or [phone] to request more information on [employer] access.    For providers and/or their staff who would like to refer a patient to [facility], please contact us through our one-stop-shop provider referral line, [facility], at [phone].    If you feel you have received this communication in error or would no longer like to receive these types of communications, please e-mail [email or web address]

## 2019-03-18 NOTE — H&P (VIEW-ONLY)
"[facility] - General Surgery  General Surgery  H&P      Patient Name: Natalie Domingo  MRN: [id number]     Chief Complaint: "I am having a colonoscopy"    HPI:  Ms. Domingo is seen today in consultation from Dr. Nagy for colon cancer screening. She has no complaints. No abdominal pain, nausea, vomiting, diarrhea, constipation, melena, hematochezia, change in bowel habits, change in stool characteristics, weight loss, decrease in caliber of stool, etc. No family history of colon cancer. No aggravating or alleviating factors. No other associated symptoms.  Colonoscopy in 2008 with a sigmoid polyp that was ablated and not biopsied.  Diverticulosis was also noted in the sigmoid colon.    Colonoscopy report from 5/29/2008 was reviewed, above findings confirmed.    Lab results were reviewed from 1/2/2019.  CBC showed no evidence of leukocytosis, anemia, or thrombocytopenia.  Electrolytes were all in the range of normal.  BUN and creatinine showed no evidence of renal dysfunction.  Glucose showed no suspicion diabetes.  Liver profile showed no evidence of hepatocellular disease or biliary obstruction. Hemoglobin A1c also showed no evidence of diabetes, 5.0%.  TSH and free T4 indicated she was euthyroid.  Lipid profile on 2/6/2019 showed hypercholesterolemia with a slightly elevated LDL but her HDL was favorable.  Triglyceride level was also favorable.    EKG reviewed from 2/6/2019.  Tracing showed a normal sinus rhythm with no evidence of any ischemic changes.        Allergies & Meds:     Allergen Reactions    Zocor [simvastatin] Rash       Current Outpatient Medications   Medication Sig Dispense Refill    amLODIPine (NORVASC) 5 MG tablet Take 1 tablet (5 mg total) by mouth once daily. In the afternoon 90 tablet 03    aspirin 325 MG tablet Take 325 mg by mouth once daily.      azelastine (ASTELIN) 137 mcg (0.1 %) nasal spray 1 spray (137 mcg total) by Nasal route 2 (two) times daily. 30 mL 2 "    benazepril (LOTENSIN) 20 MG tablet Take 1 tablet (20 mg total) by mouth once daily. 90 tablet 3    dextroamphetamine-amphetamine (ADDERALL) 20 mg tablet Take 1 tablet by mouth 2 (two) times daily as needed. 60 tablet 0    ibandronate (BONIVA) 150 mg tablet Take 1 tablet (150 mg total) by mouth every 30 days. 3 tablet 3    ibuprofen (ADVIL,MOTRIN) 600 MG tablet Take 1 tablet (600 mg total) by mouth 4 (four) times daily. 60 tablet 0    magnesium 250 mg Tab Take by mouth once daily.      multivitamin (ONE DAILY MULTIVITAMIN) per tablet Take 1 tablet by mouth once daily.      tiZANidine (ZANAFLEX) 4 MG tablet Take 4 mg by mouth every 6 (six) hours as needed.      sodium,potassium,mag sulfates (SUPREP BOWEL PREP KIT) 17.5-3.13-1.6 gram SolR Follow written instructions provided by Clinic 2 Bottle 0         PMFSHx:  Past Medical History:   Diagnosis Date    Attention deficit disorder without mention of hyperactivity     Breast cancer     Breast cancer 2010    left    Encounter for blood transfusion     Generalized osteoarthrosis, unspecified site     Hyperlipidemia     Hypertension     Migraine     Spinal stenosis        Past Surgical History:   Procedure Laterality Date    ARTHROSCOPY-MENISCECTOMY Left 6/29/2017    Performed by Vinny [facility]arate [facility] at [facility] OR    bladder lift  2016    BLOCK-FACET-LUMBAR- [place] L3/4 4/5 5/S1 Bilateral 12/15/2017    Performed [facility] Raffi Fernandez Jr[facility] at [facility] OR    BREAST SURGERY  2010    Double Mastectomy, Cancer    CARPAL TUNNEL RELEASE      CATARACT EXTRACTION, BILATERAL      CYSTOSCOPY N/A 4/27/2016    Performed by Bel Soliman [facility] at [facility] OR    CYSTOSCOPY N/A 4/11/2016    [facility] at [facility] OR    EYE SURGERY      BILAT CATARACT    KNEE SURGERY      MASTECTOMY      double    RETROPUBIC SLING N/A 4/27/2016    Performed by Bel [facility]iman [facility] at [facility] OR    SYMPATHECTOMY  1982 or 1983    TONSILLECTOMY          Family History   Problem Relation Age of Onset    Heart disease Mother        Social History     Tobacco Use    Smoking status: Current Every Day Smoker     Packs/day: 0.50     Years: 42.00     Pack years: 21.00     Types: Cigarettes    Smokeless tobacco: Never Used   Substance Use Topics    Alcohol use: Yes     Comment: social    Drug use: No       Review of Systems   Constitutional: Negative for appetite change, chills, fatigue, fever and unexpected weight change.   HENT: Negative for congestion, dental problem, ear pain, mouth sores, postnasal drip, rhinorrhea, sore throat, tinnitus, trouble swallowing and voice change.    Eyes: Negative for photophobia, pain, discharge and visual disturbance.   Respiratory: Negative for cough, chest tightness, shortness of breath and wheezing.    Cardiovascular: Negative for chest pain, palpitations and leg swelling.   Gastrointestinal: Negative for abdominal pain, blood in stool, constipation, diarrhea, nausea and vomiting.   Endocrine: Negative for cold intolerance, heat intolerance, polydipsia, polyphagia and polyuria.   Genitourinary: Negative for difficulty urinating, dysuria, flank pain, frequency, hematuria and urgency.   Musculoskeletal: Negative for arthralgias, joint swelling and myalgias.   Skin: Negative for color change and rash.   Allergic/Immunologic: Negative for immunocompromised state.   Neurological: Negative for dizziness, tremors, seizures, syncope, speech difficulty, weakness, numbness and headaches.   Hematological: Negative for adenopathy. Does not bruise/bleed easily.   Psychiatric/Behavioral: Negative for agitation, confusion, hallucinations, self-injury and suicidal ideas. The patient is not nervous/anxious.           Physical Exam   Constitutional: She is oriented to person, place, and time. She appears well-developed and well-nourished. She is active.  Non-toxic appearance. No distress. Body mass index is 25.51 kg/m².   HENT: Head:  Normocephalic and atraumatic.   Right Ear: Hearing and external ear normal. No drainage or tenderness.   Left Ear: Hearing and external ear normal. No drainage or tenderness.   Nose: Nose normal. No rhinorrhea. No epistaxis.   Mouth/Throat: Uvula is midline, oropharynx is clear and moist and mucous membranes are normal. Mucous membranes are not pale, not dry and not cyanotic. No oropharyngeal exudate.   Eyes: Conjunctivae and lids are normal. Pupils are equal, round, and reactive to light. Right eye exhibits no discharge and no exudate. Left eye exhibits no discharge and no exudate. Right conjunctiva is not injected. Right eye exhibits no nystagmus. Left eye exhibits no nystagmus.   Neck: Trachea normal, full passive range of motion without pain and phonation normal. No JVD present. Carotid bruit is not present. No tracheal deviation present. No thyroid mass and no thyromegaly present.   Cardiovascular: Normal rate, regular rhythm, S1 normal, S2 normal, normal heart sounds and intact distal pulses. PMI is not displaced. Exam reveals no gallop and no friction rub.   No murmur heard.  Pulmonary/Chest: Effort normal and breath sounds normal. No accessory muscle usage. No respiratory distress. She exhibits no mass, no tenderness and no crepitus. Right breast exhibits no skin change. Left breast exhibits no skin change.   healed bilateral modified radical mastectomies   Abdominal: Soft. Normal appearance and bowel sounds are normal. She exhibits no distension, no fluid wave, no abdominal bruit and no mass. There is no hepatosplenomegaly. There is no tenderness. There is no rebound, no guarding and negative Art's sign. No hernia. Hernia confirmed negative in the right inguinal area and confirmed negative in the left inguinal area.   Genitourinary: Rectum normal and vagina normal. There is no rash or lesion on the right labia. There is no rash or lesion on the left labia. Right adnexum displays no mass. Left adnexum  displays no mass. No vaginal discharge found.   Musculoskeletal:        Cervical back: Normal.        Thoracic back: Normal.        Lumbar back: Normal.        Right upper arm: Normal.        Left upper arm: Normal.        Right forearm: Normal.        Left forearm: Normal.        Right hand: Normal.        Left hand: Normal.        Right upper leg: Normal.        Left upper leg: Normal.        Right lower leg: Normal.        Left lower leg: Normal.        Right foot: Normal.        Left foot: Normal.   Lymphadenopathy:        Head (right side): No submental, no submandibular and no posterior auricular adenopathy present.        Head (left side): No submental, no submandibular and no posterior auricular adenopathy present.     She has no cervical adenopathy.     She has no axillary adenopathy.        Right: No inguinal and no supraclavicular adenopathy present.        Left: No inguinal and no supraclavicular adenopathy present.   Neurological: She is alert and oriented to person, place, and time. She has normal strength. No cranial nerve deficit or sensory deficit. Coordination and gait normal. GCS eye subscore is 4. GCS verbal subscore is 5. GCS motor subscore is 6.   Skin: Skin is warm, dry and intact. No rash noted. No cyanosis. Nails show no clubbing.   Psychiatric: She has a normal mood and affect. Her speech is normal. Judgment and thought content normal. Her mood appears not anxious. Her affect is not inappropriate. She is not actively hallucinating. She does not exhibit a depressed mood.         Test Results:  See above    Assessment:       Due for screening colonoscopy.  Differential diagnosis includes but not limited to colorectal cancer, diverticulosis, hemorrhoids, angiodysplasias, inflammatory bowel disease, etc.  Options include endoscopy, radiologic studies, second opinion, empiric management, observation, capsule endoscopy, etc.  Previous sigmoid polyp, ablated.  Diverticulosis of sigmoid colon.   Multiple comorbid issues ( hypertension, osteoporosis, chronic pain, ADD, breast cancer survivor ) increase the complexity of required perioperative evaluation & management, risks of complications, and likely will increase the difficulty and complexity of postoperative care, etc.  These issues must be factored in to preoperative evaluation and perioperative management.    1. Encounter for screening colonoscopy    2. Diverticulosis of colon    3. Other chronic pain    4. Essential hypertension    5. Hyperlipidemia, unspecified hyperlipidemia type    6. ADD (attention deficit disorder) without hyperactivity        Plan:   Encounter for screening colonoscopy  -     Case Request Operating Room: COLONOSCOPY    Diverticulosis of colon    Other chronic pain    Essential hypertension    Hyperlipidemia, unspecified hyperlipidemia type    ADD (attention deficit disorder) without hyperactivity    Other orders  -     sodium,potassium,mag sulfates (SUPREP BOWEL PREP KIT) 17.5-3.13-1.6 gram SolR; Follow written instructions provided by Clinic  Dispense: 2 Bottle; Refill: 0  -     Full code; Standing  -     Place in Outpatient; Standing  -     Vital signs; Standing  -     Insert peripheral IV; Standing  -     lidocaine (PF) 10 mg/ml (1%) injection 10 mg  -     Verify beta-blocker dose taken within 24 hours if patient is prescribed beta-blocker; Standing  -     Height and weight; Standing  -     Verify discontinuation of antithrombotics; Standing  -     Verify blood consent; Standing  -     Verify consent; Standing  -     Diet NPO; Standing  -     lactated ringers infusion  -     IP VTE LOW RISK PATIENT; Standing        Follow-up around 4/18/2019 for routine post-endosocpy visit.    Counseling/Medical Decision Making:  Natalie Domingo was counseled regarding the results of the evaluation thus far and the differential diagnosis.  Diagnostic and therapeutic options were discussed in detail along with the risks, benefits,  possible complications, details of, and indications for each option.  Diagnoses discussed included but were not limited to: hemorrhoids, diverticulosis, cancer, IBD, IBS, benign tumors, angiodysplasias.  Options discussed included but were not limited to: colonoscopy, proctoscopy, anoscopy, sigmoidoscopy, radiologic studies, PillCam, empiric therapy, second opinion, etc. Possible complications of colonoscopy discussed included but were not limited to: bleeding, infections, endocarditis, injury to internal organs, incomplete exam, failure to diagnose cancer or other serious condition, need for emergency surgery, need for a temporary colostomy, need for additional operations or procedures, etc.  Entire conversation was held in layman's terms.  Questions solicited and answered.  I read the consent form to her verbatim.  All unfamiliar terms were defined.  Krames booklets were provided on colonoscopy, polyps, diverticulosis, hemorrhoids, cancer, etc.  A copy of the consent form was provided as well for her review outside the office / hospital prior to the procedure.  At the conclusion of the conversation she voiced complete understanding of all we discussed and satisfaction that all of her questions had been answered to her full understanding.  She stated that she felt fully informed and totally capable of making an informed decision.  She desires and requests to proceed with colonoscopy.

## 2019-03-18 NOTE — PROGRESS NOTES
Subjective:       Patient ID: Natalie Domingo is a 63 y.o. female.    Chief Complaint: Colonoscopy      HPI:  Ms. Domingo is seen today in consultation from Dr. Nagy for colon cancer screening. She has no complaints. No abdominal pain, nausea, vomiting, diarrhea, constipation, melena, hematochezia, change in bowel habits, change in stool characteristics, weight loss, decrease in caliber of stool, etc. No family history of colon cancer. No aggravating or alleviating factors. No other associated symptoms.  Colonoscopy in 2008 with a sigmoid polyp that was ablated and not biopsied.  Diverticulosis was also noted in the sigmoid colon.    Colonoscopy report from 5/29/2008 was reviewed, above findings confirmed.    Lab results were reviewed from 1/2/2019.  CBC showed no evidence of leukocytosis, anemia, or thrombocytopenia.  Electrolytes were all in the range of normal.  BUN and creatinine showed no evidence of renal dysfunction.  Glucose showed no suspicion diabetes.  Liver profile showed no evidence of hepatocellular disease or biliary obstruction. Hemoglobin A1c also showed no evidence of diabetes, 5.0%.  TSH and free T4 indicated she was euthyroid.  Lipid profile on 2/6/2019 showed hypercholesterolemia with a slightly elevated LDL but her HDL was favorable.  Triglyceride level was also favorable.    EKG reviewed from 2/6/2019.  Tracing showed a normal sinus rhythm with no evidence of any ischemic changes.        Allergies & Meds:  Review of patient's allergies indicates:   Allergen Reactions    Zocor [simvastatin] Rash       Current Outpatient Medications   Medication Sig Dispense Refill    amLODIPine (NORVASC) 5 MG tablet Take 1 tablet (5 mg total) by mouth once daily. In the afternoon 90 tablet 03    aspirin 325 MG tablet Take 325 mg by mouth once daily.      azelastine (ASTELIN) 137 mcg (0.1 %) nasal spray 1 spray (137 mcg total) by Nasal route 2 (two) times daily. 30 mL 2    benazepril (LOTENSIN) 20  MG tablet Take 1 tablet (20 mg total) by mouth once daily. 90 tablet 3    dextroamphetamine-amphetamine (ADDERALL) 20 mg tablet Take 1 tablet by mouth 2 (two) times daily as needed. 60 tablet 0    ibandronate (BONIVA) 150 mg tablet Take 1 tablet (150 mg total) by mouth every 30 days. 3 tablet 3    ibuprofen (ADVIL,MOTRIN) 600 MG tablet Take 1 tablet (600 mg total) by mouth 4 (four) times daily. 60 tablet 0    magnesium 250 mg Tab Take by mouth once daily.      multivitamin (ONE DAILY MULTIVITAMIN) per tablet Take 1 tablet by mouth once daily.      tiZANidine (ZANAFLEX) 4 MG tablet Take 4 mg by mouth every 6 (six) hours as needed.      sodium,potassium,mag sulfates (SUPREP BOWEL PREP KIT) 17.5-3.13-1.6 gram SolR Follow written instructions provided by Clinic 2 Bottle 0     No current facility-administered medications for this visit.        PMFSHx:  Past Medical History:   Diagnosis Date    Attention deficit disorder without mention of hyperactivity     Breast cancer     Breast cancer 2010    left    Encounter for blood transfusion     Generalized osteoarthrosis, unspecified site     Hyperlipidemia     Hypertension     Migraine     Spinal stenosis        Past Surgical History:   Procedure Laterality Date    ARTHROSCOPY-MENISCECTOMY Left 6/29/2017    [facility]    bladder lift  2016    BLOCK-FACET-LUMBAR- [place] L3/4 4/5 5/S1 Bilateral 12/15/2017    Per[facility] by Raffi Fernandez Jr., [facility] at [facility] OR    BREAST SURGERY  2010    Double Mastectomy, Cancer    CARPAL TUNNEL RELEASE      CATARACT EXTRACTION, BILATERAL      CYSTOSCOPY N/A 4/27/2016    [facility] Bel Soliman [facility] at [facility] OR    CYSTOSCOPY N/A 4/11/2016    [facility]    EYE SURGERY      BILAT CATARACT    KNEE SURGERY      MASTECTOMY      double    RETROPUBIC SLING N/A 4/27/2016    Performed [facility] Bel [facility]iman [facility] at [facility] OR    SYMPATHECTOMY   1982 or 1983    TONSILLECTOMY         Family History   Problem Relation Age of Onset    Heart disease Mother        Social History     Tobacco Use    Smoking status: Current Every Day Smoker     Packs/day: 0.50     Years: 42.00     Pack years: 21.00     Types: Cigarettes    Smokeless tobacco: Never Used   Substance Use Topics    Alcohol use: Yes     Comment: social    Drug use: No       Review of Systems   Constitutional: Negative for appetite change, chills, fatigue, fever and unexpected weight change.   HENT: Negative for congestion, dental problem, ear pain, mouth sores, postnasal drip, rhinorrhea, sore throat, tinnitus, trouble swallowing and voice change.    Eyes: Negative for photophobia, pain, discharge and visual disturbance.   Respiratory: Negative for cough, chest tightness, shortness of breath and wheezing.    Cardiovascular: Negative for chest pain, palpitations and leg swelling.   Gastrointestinal: Negative for abdominal pain, blood in stool, constipation, diarrhea, nausea and vomiting.   Endocrine: Negative for cold intolerance, heat intolerance, polydipsia, polyphagia and polyuria.   Genitourinary: Negative for difficulty urinating, dysuria, flank pain, frequency, hematuria and urgency.   Musculoskeletal: Negative for arthralgias, joint swelling and myalgias.   Skin: Negative for color change and rash.   Allergic/Immunologic: Negative for immunocompromised state.   Neurological: Negative for dizziness, tremors, seizures, syncope, speech difficulty, weakness, numbness and headaches.   Hematological: Negative for adenopathy. Does not bruise/bleed easily.   Psychiatric/Behavioral: Negative for agitation, confusion, hallucinations, self-injury and suicidal ideas. The patient is not nervous/anxious.        Objective:      Physical Exam   Constitutional: She is oriented to person, place, and time. She appears well-developed and well-nourished. She is active.  Non-toxic appearance. No distress.   Body  mass index is 25.51 kg/m².     HENT:   Head: Normocephalic and atraumatic.   Right Ear: Hearing and external ear normal. No drainage or tenderness.   Left Ear: Hearing and external ear normal. No drainage or tenderness.   Nose: Nose normal. No rhinorrhea. No epistaxis.   Mouth/Throat: Uvula is midline, oropharynx is clear and moist and mucous membranes are normal. Mucous membranes are not pale, not dry and not cyanotic. No oropharyngeal exudate.   Eyes: Conjunctivae and lids are normal. Pupils are equal, round, and reactive to light. Right eye exhibits no discharge and no exudate. Left eye exhibits no discharge and no exudate. Right conjunctiva is not injected. Right eye exhibits no nystagmus. Left eye exhibits no nystagmus.   Neck: Trachea normal, full passive range of motion without pain and phonation normal. No JVD present. Carotid bruit is not present. No tracheal deviation present. No thyroid mass and no thyromegaly present.   Cardiovascular: Normal rate, regular rhythm, S1 normal, S2 normal, normal heart sounds and intact distal pulses. PMI is not displaced. Exam reveals no gallop and no friction rub.   No murmur heard.  Pulmonary/Chest: Effort normal and breath sounds normal. No accessory muscle usage. No respiratory distress. She exhibits no mass, no tenderness and no crepitus. Right breast exhibits no skin change. Left breast exhibits no skin change.   healed bilateral modified radical mastectomies   Abdominal: Soft. Normal appearance and bowel sounds are normal. She exhibits no distension, no fluid wave, no abdominal bruit and no mass. There is no hepatosplenomegaly. There is no tenderness. There is no rebound, no guarding and negative Art's sign. No hernia. Hernia confirmed negative in the right inguinal area and confirmed negative in the left inguinal area.   Genitourinary: Rectum normal and vagina normal. There is no rash or lesion on the right labia. There is no rash or lesion on the left labia.  Right adnexum displays no mass. Left adnexum displays no mass. No vaginal discharge found.   Musculoskeletal:        Cervical back: Normal.        Thoracic back: Normal.        Lumbar back: Normal.        Right upper arm: Normal.        Left upper arm: Normal.        Right forearm: Normal.        Left forearm: Normal.        Right hand: Normal.        Left hand: Normal.        Right upper leg: Normal.        Left upper leg: Normal.        Right lower leg: Normal.        Left lower leg: Normal.        Right foot: Normal.        Left foot: Normal.   Lymphadenopathy:        Head (right side): No submental, no submandibular and no posterior auricular adenopathy present.        Head (left side): No submental, no submandibular and no posterior auricular adenopathy present.     She has no cervical adenopathy.     She has no axillary adenopathy.        Right: No inguinal and no supraclavicular adenopathy present.        Left: No inguinal and no supraclavicular adenopathy present.   Neurological: She is alert and oriented to person, place, and time. She has normal strength. No cranial nerve deficit or sensory deficit. Coordination and gait normal. GCS eye subscore is 4. GCS verbal subscore is 5. GCS motor subscore is 6.   Skin: Skin is warm, dry and intact. No rash noted. No cyanosis. Nails show no clubbing.   Psychiatric: She has a normal mood and affect. Her speech is normal. Judgment and thought content normal. Her mood appears not anxious. Her affect is not inappropriate. She is not actively hallucinating. She does not exhibit a depressed mood.         Test Results:  See above    Assessment:       Due for screening colonoscopy.  Differential diagnosis includes but not limited to colorectal cancer, diverticulosis, hemorrhoids, angiodysplasias, inflammatory bowel disease, etc.  Options include endoscopy, radiologic studies, second opinion, empiric management, observation, capsule endoscopy, etc.  Previous sigmoid polyp,  ablated.  Diverticulosis of sigmoid colon.  Multiple comorbid issues ( hypertension, osteoporosis, chronic pain, ADD, breast cancer survivor ) increase the complexity of required perioperative evaluation & management, risks of complications, and likely will increase the difficulty and complexity of postoperative care, etc.  These issues must be factored in to preoperative evaluation and perioperative management.    1. Encounter for screening colonoscopy    2. Diverticulosis of colon    3. Other chronic pain    4. Essential hypertension    5. Hyperlipidemia, unspecified hyperlipidemia type    6. ADD (attention deficit disorder) without hyperactivity        Plan:   Encounter for screening colonoscopy  -     Case Request Operating Room: COLONOSCOPY    Diverticulosis of colon    Other chronic pain    Essential hypertension    Hyperlipidemia, unspecified hyperlipidemia type    ADD (attention deficit disorder) without hyperactivity    Other orders  -     sodium,potassium,mag sulfates (SUPREP BOWEL PREP KIT) 17.5-3.13-1.6 gram SolR; Follow written instructions provided by Clinic  Dispense: 2 Bottle; Refill: 0  -     Full code; Standing  -     Place in Outpatient; Standing  -     Vital signs; Standing  -     Insert peripheral IV; Standing  -     lidocaine (PF) 10 mg/ml (1%) injection 10 mg  -     Verify beta-blocker dose taken within 24 hours if patient is prescribed beta-blocker; Standing  -     Height and weight; Standing  -     Verify discontinuation of antithrombotics; Standing  -     Verify blood consent; Standing  -     Verify consent; Standing  -     Diet NPO; Standing  -     lactated ringers infusion  -     IP VTE LOW RISK PATIENT; Standing        Follow-up in about 1 month (around 4/18/2019) for routine post-endosocpy visit.    Counseling/Medical Decision Making:  Natalie Domingo was counseled regarding the results of the evaluation thus far and the differential diagnosis.  Diagnostic and therapeutic options  were discussed in detail along with the risks, benefits, possible complications, details of, and indications for each option.  Diagnoses discussed included but were not limited to: hemorrhoids, diverticulosis, cancer, IBD, IBS, benign tumors, angiodysplasias.  Options discussed included but were not limited to: colonoscopy, proctoscopy, anoscopy, sigmoidoscopy, radiologic studies, PillCam, empiric therapy, second opinion, etc. Possible complications of colonoscopy discussed included but were not limited to: bleeding, infections, endocarditis, injury to internal organs, incomplete exam, failure to diagnose cancer or other serious condition, need for emergency surgery, need for a temporary colostomy, need for additional operations or procedures, etc.  Entire conversation was held in layman's terms.  Questions solicited and answered.  I read the consent form to her verbatim.  All unfamiliar terms were defined.  Krames booklets were provided on colonoscopy, polyps, diverticulosis, hemorrhoids, cancer, etc.  A copy of the consent form was provided as well for her review outside the office / hospital prior to the procedure.  At the conclusion of the conversation she voiced complete understanding of all we discussed and satisfaction that all of her questions had been answered to her full understanding.  She stated that she felt fully informed and totally capable of making an informed decision.  She desires and requests to proceed with colonoscopy.    Total face-to-face encounter time was 30 minutes with 15 minutes spent counseling the patient as outlined/summarized above.

## 2019-03-29 ENCOUNTER — ANESTHESIA (OUTPATIENT)
Dept: SURGERY | Facility: HOSPITAL | Age: 64
End: 2019-03-29
Payer: COMMERCIAL

## 2019-03-29 ENCOUNTER — HOSPITAL ENCOUNTER (OUTPATIENT)
Facility: HOSPITAL | Age: 64
Discharge: HOME | End: 2019-03-29
Attending: SURGERY | Admitting: SURGERY
Payer: COMMERCIAL

## 2019-03-29 ENCOUNTER — ANESTHESIA EVENT (OUTPATIENT)
Dept: SURGERY | Facility: HOSPITAL | Age: 64
End: 2019-03-29
Payer: COMMERCIAL

## 2019-03-29 DIAGNOSIS — Z12.11 ENCOUNTER FOR SCREENING COLONOSCOPY: ICD-10-CM

## 2019-03-29 PROBLEM — K57.30 DIVERTICULOSIS OF COLON: Status: ACTIVE | Noted: 2019-03-29

## 2019-03-29 PROCEDURE — 25000003 PHARM REV CODE 250

## 2019-03-29 PROCEDURE — S0028 INJECTION, FAMOTIDINE, 20 MG: HCPCS

## 2019-03-29 PROCEDURE — D9220A PRA ANESTHESIA: ICD-10-PCS | Mod: CRNA,,, | Performed by: NURSE ANESTHETIST, CERTIFIED REGISTERED

## 2019-03-29 PROCEDURE — G0121 COLON CA SCRN NOT HI RSK IND: HCPCS | Performed by: SURGERY

## 2019-03-29 PROCEDURE — 45378 DIAGNOSTIC COLONOSCOPY: CPT | Performed by: SURGERY

## 2019-03-29 PROCEDURE — 25000003 PHARM REV CODE 250: Performed by: SURGERY

## 2019-03-29 PROCEDURE — 37000009 HC ANESTHESIA EA ADD 15 MINS: Performed by: SURGERY

## 2019-03-29 PROCEDURE — G0121 COLON CA SCRN NOT HI RSK IND: HCPCS | Mod: ,,, | Performed by: SURGERY

## 2019-03-29 PROCEDURE — D9220A PRA ANESTHESIA: Mod: CRNA,,, | Performed by: NURSE ANESTHETIST, CERTIFIED REGISTERED

## 2019-03-29 PROCEDURE — 63600175 PHARM REV CODE 636 W HCPCS: Performed by: NURSE ANESTHETIST, CERTIFIED REGISTERED

## 2019-03-29 PROCEDURE — 37000008 HC ANESTHESIA 1ST 15 MINUTES: Performed by: SURGERY

## 2019-03-29 PROCEDURE — D9220A PRA ANESTHESIA: ICD-10-PCS | Mod: ANES,,, | Performed by: ANESTHESIOLOGY

## 2019-03-29 PROCEDURE — G0121 COLON CA SCRN NOT HI RSK IND: ICD-10-PCS | Mod: ,,, | Performed by: SURGERY

## 2019-03-29 PROCEDURE — D9220A PRA ANESTHESIA: Mod: ANES,,, | Performed by: ANESTHESIOLOGY

## 2019-03-29 RX ORDER — ONDANSETRON 2 MG/ML
4 INJECTION INTRAMUSCULAR; INTRAVENOUS DAILY PRN
Status: DISCONTINUED | OUTPATIENT
Start: 2019-03-29 | End: 2019-03-29 | Stop reason: HOSPADM

## 2019-03-29 RX ORDER — SODIUM CHLORIDE, SODIUM LACTATE, POTASSIUM CHLORIDE, CALCIUM CHLORIDE 600; 310; 30; 20 MG/100ML; MG/100ML; MG/100ML; MG/100ML
INJECTION, SOLUTION INTRAVENOUS CONTINUOUS
Status: DISCONTINUED | OUTPATIENT
Start: 2019-03-29 | End: 2019-03-29 | Stop reason: HOSPADM

## 2019-03-29 RX ORDER — FAMOTIDINE 10 MG/ML
INJECTION INTRAVENOUS
Status: COMPLETED
Start: 2019-03-29 | End: 2019-03-29

## 2019-03-29 RX ORDER — FAMOTIDINE 10 MG/ML
20 INJECTION INTRAVENOUS ONCE
Status: DISCONTINUED | OUTPATIENT
Start: 2019-03-29 | End: 2019-03-29 | Stop reason: HOSPADM

## 2019-03-29 RX ORDER — DIPHENHYDRAMINE HYDROCHLORIDE 50 MG/ML
12.5 INJECTION INTRAMUSCULAR; INTRAVENOUS
Status: DISCONTINUED | OUTPATIENT
Start: 2019-03-29 | End: 2019-03-29 | Stop reason: HOSPADM

## 2019-03-29 RX ORDER — MIDAZOLAM HYDROCHLORIDE 1 MG/ML
INJECTION, SOLUTION INTRAMUSCULAR; INTRAVENOUS
Status: DISCONTINUED | OUTPATIENT
Start: 2019-03-29 | End: 2019-03-29

## 2019-03-29 RX ORDER — LIDOCAINE HYDROCHLORIDE 10 MG/ML
1 INJECTION, SOLUTION EPIDURAL; INFILTRATION; INTRACAUDAL; PERINEURAL ONCE
Status: DISCONTINUED | OUTPATIENT
Start: 2019-03-29 | End: 2019-03-29 | Stop reason: HOSPADM

## 2019-03-29 RX ORDER — SODIUM CHLORIDE, SODIUM LACTATE, POTASSIUM CHLORIDE, CALCIUM CHLORIDE 600; 310; 30; 20 MG/100ML; MG/100ML; MG/100ML; MG/100ML
125 INJECTION, SOLUTION INTRAVENOUS CONTINUOUS
Status: DISCONTINUED | OUTPATIENT
Start: 2019-03-29 | End: 2019-03-29 | Stop reason: HOSPADM

## 2019-03-29 RX ORDER — GLUCAGON 1 MG
KIT INJECTION
Status: DISCONTINUED | OUTPATIENT
Start: 2019-03-29 | End: 2019-03-29

## 2019-03-29 RX ORDER — PROPOFOL 10 MG/ML
VIAL (ML) INTRAVENOUS
Status: DISCONTINUED | OUTPATIENT
Start: 2019-03-29 | End: 2019-03-29

## 2019-03-29 RX ADMIN — MIDAZOLAM HYDROCHLORIDE 1 MG: 1 INJECTION, SOLUTION INTRAMUSCULAR; INTRAVENOUS at 08:03

## 2019-03-29 RX ADMIN — PROPOFOL 30 MG: 10 INJECTION, EMULSION INTRAVENOUS at 08:03

## 2019-03-29 RX ADMIN — PROPOFOL 30 MG: 10 INJECTION, EMULSION INTRAVENOUS at 09:03

## 2019-03-29 RX ADMIN — GLUCAGON HYDROCHLORIDE 1 MG: KIT at 09:03

## 2019-03-29 RX ADMIN — FAMOTIDINE 20 MG: 10 INJECTION INTRAVENOUS at 08:03

## 2019-03-29 RX ADMIN — SODIUM CHLORIDE, POTASSIUM CHLORIDE, SODIUM LACTATE AND CALCIUM CHLORIDE: 600; 310; 30; 20 INJECTION, SOLUTION INTRAVENOUS at 08:03

## 2019-03-29 NOTE — PROGRESS NOTES
Ms. Domingo informed nursing personnel that she had fallen yesterday striking her right shoulder on her golf cart experiencing severe pain. Pain has persisted.  This information was not shared with myself during her preoperative evaluation this morning.    Exam:  Skin intact. No bruising or gross deformity.  Full range of motion right shoulder and elbow.  No palpable crepitus.  No palpable point tenderness. Clavicle intact.  Humerus intact. Scapula intact.    Right shoulder imaging warranted.  Further management will depend upon clinical course. May require MRI of shoulder and orthopedic consultation if symptoms persist.  Will re-evaluate next office visit.  X-rays will be obtained today prior to discharge.

## 2019-03-29 NOTE — ANESTHESIA POSTPROCEDURE EVALUATION
Anesthesia Post Evaluation    Patient: Natalie Domingo    Procedure(s) Performed: Procedure(s) (LRB):  COLONOSCOPY (N/A)    Final Anesthesia Type: general  Patient location during evaluation: PACU  Patient participation: Yes- Able to Participate  Level of consciousness: awake and alert  Post-procedure vital signs: reviewed and stable  Pain management: adequate  Airway patency: patent  PONV status at discharge: No PONV  Anesthetic complications: no      Cardiovascular status: blood pressure returned to baseline  Respiratory status: unassisted  Hydration status: euvolemic  Follow-up not needed.          Vitals Value Taken Time   /79 3/29/2019 10:02 AM   Temp 36.4 °C (97.6 °F) 3/29/2019  9:35 AM   Pulse 59 3/29/2019 10:03 AM   Resp 23 3/29/2019 10:03 AM   SpO2 96 % 3/29/2019 10:03 AM   Vitals shown include unvalidated device data.      Event Time     Out of Recovery 10:08:04          Pain/Radha Score: Radha Score: 10 (3/29/2019 10:00 AM)

## 2019-03-29 NOTE — DISCHARGE SUMMARY
[facility]  Discharge Note  Short Stay    Admit Date: 3/29/2019    Discharge Date and Time: No discharge date for patient encounter.     Attending Physician: Hans Harris MD     Discharge Provider: Hans Harris    Diagnoses:  Active Hospital Problems    Diagnosis  POA    *Encounter for screening colonoscopy [Z12.11]  Not Applicable    Diverticulosis of colon [K57.30]  Yes      Resolved Hospital Problems   No resolved problems to display.       Discharged Condition: good    Hospital Course: Patient was admitted for an outpatient procedure and tolerated the procedure well with no complications.    Final Diagnoses: Same as principal problem.    Disposition: Home or Self Care    Follow up/Patient Instructions:    Medications:  Reconciled Home Medications:      Medication List      CONTINUE taking these medications    amLODIPine 5 MG tablet  Commonly known as:  NORVASC  Take 1 tablet (5 mg total) by mouth once daily. In the afternoon     aspirin 325 MG tablet  Take 325 mg by mouth once daily.     azelastine 137 mcg (0.1 %) nasal spray  Commonly known as:  ASTELIN  1 spray (137 mcg total) by Nasal route 2 (two) times daily.     benazepril 20 MG tablet  Commonly known as:  LOTENSIN  Take 1 tablet (20 mg total) by mouth once daily.     dextroamphetamine-amphetamine 20 mg tablet  Commonly known as:  ADDERALL  Take 1 tablet by mouth 2 (two) times daily as needed.     ibandronate 150 mg tablet  Commonly known as:  BONIVA  Take 1 tablet (150 mg total) by mouth every 30 days.     ibuprofen 600 MG tablet  Commonly known as:  ADVIL,MOTRIN  Take 1 tablet (600 mg total) by mouth 4 (four) times daily.     magnesium 250 mg Tab  Take by mouth once daily.     ONE DAILY MULTIVITAMIN per tablet  Generic drug:  multivitamin  Take 1 tablet by mouth once daily.     tiZANidine 4 MG tablet  Commonly known as:  ZANAFLEX  Take 4 mg by mouth every 6 (six) hours as needed.        STOP taking these medications     sodium,potassium,mag sulfates 17.5-3.13-1.6 gram Solr  Commonly known as:  SUPREP BOWEL PREP KIT          Discharge Procedure Orders   Diet Adult Regular     Order Specific Question Answer Comments   Additional restrictions: High Fiber      No driving until:     Order Specific Question Answer Comments   Date: 3/30/2019      Follow-up Information     Hans Harris MD. Schedule an appointment as soon as possible for a visit in 2 weeks.    Specialty:  General Surgery  Why:  Routine Post Endoscopy Visit  Contact information:  [address]  [facility] [address] [phone]                   Discharge Procedure Orders (must include Diet, Follow-up, Activity):   Discharge Procedure Orders (must include Diet, Follow-up, Activity)   Diet Adult Regular     Order Specific Question Answer Comments   Additional restrictions: High Fiber      No driving until:     Order Specific Question Answer Comments   Date: 3/30/2019

## 2019-03-29 NOTE — INTERVAL H&P NOTE
The patient has been examined and the H&P has been reviewed:    I concur with the findings and no changes have occurred since H&P was written.     Surgery risks, benefits and alternative options discussed and understood by patient/family.    No contraindication to proceeding with planned procedure today.      Active Hospital Problems    Diagnosis  POA    Encounter for screening colonoscopy [Z12.11]  Not Applicable      Resolved Hospital Problems   No resolved problems to display.

## 2019-03-29 NOTE — [employer] PATIENT INSTRUCTIONS
Discharge Summary/Instructions after an Endoscopic Procedure  Patient Name: Natalie Domingo  Patient MRN: [id number]  Patient YOB: 1955 Friday, March 29, 2019  Hans Harris MD  RESTRICTIONS:  During your procedure today, you received medications for sedation.  These   medications may affect your judgment, balance and coordination.  Therefore,   for 24 hours, you have the following restrictions:   - DO NOT drive a car, operate machinery, make legal/financial decisions,   sign important papers or drink alcohol.    ACTIVITY:  Today: no heavy lifting, straining or running due to procedural   sedation/anesthesia.  The following day: return to full activity including work.  DIET:  Eat and drink normally unless instructed otherwise.     TREATMENT FOR COMMON SIDE EFFECTS:  - Mild abdominal pain, nausea, belching, bloating or excessive gas:  rest,   eat lightly and use a heating pad.  - Sore Throat: treat with throat lozenges and/or gargle with warm salt   water.  - Because air was used during the procedure, expelling large amounts of air   from your rectum or belching is normal.  - If a bowel prep was taken, you may not have a bowel movement for 1-3 days.    This is normal.  SYMPTOMS TO WATCH FOR AND REPORT TO YOUR PHYSICIAN:  1. Abdominal pain or bloating, other than gas cramps.  2. Chest pain.  3. Back pain.  4. Signs of infection such as: chills or fever occurring within 24 hours   after the procedure.  5. Rectal bleeding, which would show as bright red, maroon, or black stools.   (A tablespoon of blood from the rectum is not serious, especially if   hemorrhoids are present.)  6. Vomiting.  7. Weakness or dizziness.  GO DIRECTLY TO THE NEAREST EMERGENCY ROOM IF YOU HAVE ANY OF THE FOLLOWING:      Difficulty breathing              Chills and/or fever over 101 F   Persistent vomiting and/or vomiting blood   Severe abdominal pain   Severe chest pain   Black, tarry stools   Bleeding- more than one  tablespoon   Any other symptom or condition that you feel may need urgent attention  Your doctor recommends these additional instructions:  If any biopsies were taken, your doctors clinic will contact you in 1 to 2   weeks with any results.  - Discharge patient to home.   - High fiber diet.   - Continue present medications.   - Repeat colonoscopy in 5 years for screening purposes.   - Return to my office in 2 weeks.   - Further recommendations will depend on clinical course  For questions, problems or results please call your physician - Hans Harris MD at Work:  [phone].  [facility] EMERGENCY ROOM PHONE NUMBER: [phone]  IF A COMPLICATION OR EMERGENCY SITUATION ARISES AND YOU ARE UNABLE TO REACH   YOUR PHYSICIAN - GO DIRECTLY TO THE EMERGENCY ROOM.  MD Hans Moeller MD  3/29/2019 10:54:02 AM  This report has been verified and signed electronically.  [employer]

## 2019-03-29 NOTE — PROGRESS NOTES
X-rays of the right humerus, shoulder, and scapula films and reports were reviewed.  No acute injuries detected.  No soft tissue swelling. No joint dislocation.  No fractures or subluxation.    Activity restrictions are recommended at this time.  A sling was offered and declined.  Pain will be re-evaluated next clinic visit.  Persistent pain will precipitate MRI and orthopedic referral.

## 2019-03-29 NOTE — TRANSFER OF CARE
"Anesthesia Transfer of Care Note    Patient: Natalie Domingo    Procedure(s) Performed: Procedure(s) (LRB):  COLONOSCOPY (N/A)    Patient location: PACU    Anesthesia Type: general    Transport from OR: Transported from OR on room air with adequate spontaneous ventilation    Post pain: adequate analgesia    Post assessment: no apparent anesthetic complications and tolerated procedure well    Post vital signs: stable    Level of consciousness: awake, alert and oriented    Nausea/Vomiting: no nausea/vomiting    Complications: none    Transfer of care protocol was followed      Last vitals:   Visit Vitals  /80 (BP Location: Right arm, Patient Position: Lying)   Pulse 71   Temp 36.6 °C (97.8 °F) (Oral)   Resp 20   Ht 5' 3" (1.6 m)   Wt 64.4 kg (142 lb)   SpO2 98%   Breastfeeding? No   BMI 25.15 kg/m²     "

## 2019-03-29 NOTE — PLAN OF CARE
0849- PT STATED THAT SHE FELL AND HIT HER RT SHOULDER  ON THE GOLF CART LAST NIGHT 3/28/19. C/O PAIN ( 10 ) RT SHOULDER. DID NOT SEEK MEDICAL ASSISTANCE.

## 2019-03-29 NOTE — ANESTHESIA PREPROCEDURE EVALUATION
03/29/2019  Natalie Domingo is a 63 y.o., female.    Anesthesia Evaluation    I have reviewed the Patient Summary Reports.    I have reviewed the Nursing Notes.   I have reviewed the Medications.     Review of Systems  Hematology/Oncology:  Hematology Normal   Oncology Normal     EENT/Dental:EENT/Dental Normal   Cardiovascular:   Hypertension    Pulmonary:  Pulmonary Normal    Renal/:  Renal/ Normal     Hepatic/GI:  Hepatic/GI Normal    Musculoskeletal:   Arthritis   Spine Disorders: Chronic Pain    Neurological:   Headaches    Psych:   anxiety depression          Physical Exam  General:  Well nourished    Airway/Jaw/Neck:  Airway Findings: Mouth Opening: Normal Tongue: Normal  General Airway Assessment: Adult  Mallampati: II  TM Distance: Normal, at least 6 cm       Chest/Lungs:  Chest/Lungs Findings: Clear to auscultation     Heart/Vascular:  Heart Findings: Rate: Normal  Rhythm: Regular Rhythm        Mental Status:  Mental Status Findings:  Cooperative, Alert and Oriented         Anesthesia Plan  Type of Anesthesia, risks & benefits discussed:  Anesthesia Type:  general  Patient's Preference:   Intra-op Monitoring Plan: standard ASA monitors  Intra-op Monitoring Plan Comments:   Post Op Pain Control Plan: IV/PO Opioids PRN  Post Op Pain Control Plan Comments:   Induction:   IV  Beta Blocker:  Patient is not currently on a Beta-Blocker (No further documentation required).       Informed Consent: Patient understands risks and agrees with Anesthesia plan.  Questions answered. Anesthesia consent signed with patient.  ASA Score: 2     Day of Surgery Review of History & Physical: I have interviewed and examined the patient. I have reviewed the patient's H&P dated:            Ready For Surgery From Anesthesia Perspective.

## 2019-04-03 ENCOUNTER — NURSE TRIAGE (OUTPATIENT)
Dept: ADMINISTRATIVE | Facility: CLINIC | Age: 64
End: 2019-04-03

## 2019-04-03 NOTE — TELEPHONE ENCOUNTER
Thinks she might have a torn rotator cuff or something, her shoulder is hurting her so badly.  Slipped and hit her shoulder on the corner of her golf cart and has been doing some lifting lately, as well.  She couldn't sleep last night, after doing some manual labor in the yard.  She saw Dr. Zarate several years ago, but wants to see an orthopedist at [facility].  Advised ED or UCC until she could get in with pcp or orthopedist.    Reason for Disposition   SEVERE pain (e.g., excruciating, unable to do any normal activities)    Protocols used: SHOULDER PAIN-A-OH

## 2019-04-08 VITALS
HEIGHT: 63 IN | TEMPERATURE: 98 F | DIASTOLIC BLOOD PRESSURE: 79 MMHG | BODY MASS INDEX: 25.16 KG/M2 | HEART RATE: 67 BPM | RESPIRATION RATE: 25 BRPM | SYSTOLIC BLOOD PRESSURE: 117 MMHG | WEIGHT: 142 LBS | OXYGEN SATURATION: 97 %

## 2019-06-25 ENCOUNTER — HOSPITAL ENCOUNTER (EMERGENCY)
Facility: HOSPITAL | Age: 64
Discharge: HOME | End: 2019-06-25
Attending: INTERNAL MEDICINE
Payer: COMMERCIAL

## 2019-06-25 VITALS
SYSTOLIC BLOOD PRESSURE: 140 MMHG | TEMPERATURE: 98 F | BODY MASS INDEX: 25.69 KG/M2 | HEIGHT: 63 IN | RESPIRATION RATE: 18 BRPM | OXYGEN SATURATION: 96 % | DIASTOLIC BLOOD PRESSURE: 95 MMHG | HEART RATE: 78 BPM | WEIGHT: 145 LBS

## 2019-06-25 DIAGNOSIS — K62.5 RECTAL BLEED: Primary | ICD-10-CM

## 2019-06-25 LAB
ALBUMIN SERPL BCP-MCNC: 4.1 G/DL (ref 3.5–5.2)
ALP SERPL-CCNC: 67 U/L (ref 55–135)
ALT SERPL W/O P-5'-P-CCNC: 17 U/L (ref 10–44)
ANION GAP SERPL CALC-SCNC: 11 MMOL/L (ref 8–16)
AST SERPL-CCNC: 21 U/L (ref 10–40)
BACTERIA #/AREA URNS HPF: ABNORMAL /HPF
BASOPHILS # BLD AUTO: 0.08 K/UL (ref 0–0.2)
BASOPHILS NFR BLD: 1.1 % (ref 0–1.9)
BILIRUB SERPL-MCNC: 0.6 MG/DL (ref 0.1–1)
BILIRUB UR QL STRIP: NEGATIVE
BUN SERPL-MCNC: 14 MG/DL (ref 8–23)
CALCIUM SERPL-MCNC: 10 MG/DL (ref 8.7–10.5)
CHLORIDE SERPL-SCNC: 101 MMOL/L (ref 95–110)
CLARITY UR: CLEAR
CO2 SERPL-SCNC: 26 MMOL/L (ref 23–29)
COLOR UR: YELLOW
CREAT SERPL-MCNC: 0.6 MG/DL (ref 0.5–1.4)
DIFFERENTIAL METHOD: ABNORMAL
EOSINOPHIL # BLD AUTO: 0.3 K/UL (ref 0–0.5)
EOSINOPHIL NFR BLD: 3.8 % (ref 0–8)
ERYTHROCYTE [DISTWIDTH] IN BLOOD BY AUTOMATED COUNT: 13.2 % (ref 11.5–14.5)
EST. GFR  (AFRICAN AMERICAN): >60 ML/MIN/1.73 M^2
EST. GFR  (NON AFRICAN AMERICAN): >60 ML/MIN/1.73 M^2
GLUCOSE SERPL-MCNC: 100 MG/DL (ref 70–110)
GLUCOSE UR QL STRIP: NEGATIVE
HCT VFR BLD AUTO: 45.7 % (ref 37–48.5)
HGB BLD-MCNC: 15.1 G/DL (ref 12–16)
HGB UR QL STRIP: ABNORMAL
IMM GRANULOCYTES # BLD AUTO: 0.02 K/UL (ref 0–0.04)
IMM GRANULOCYTES NFR BLD AUTO: 0.3 % (ref 0–0.5)
KETONES UR QL STRIP: NEGATIVE
LEUKOCYTE ESTERASE UR QL STRIP: NEGATIVE
LYMPHOCYTES # BLD AUTO: 1.9 K/UL (ref 1–4.8)
LYMPHOCYTES NFR BLD: 25.9 % (ref 18–48)
MCH RBC QN AUTO: 33.2 PG (ref 27–31)
MCHC RBC AUTO-ENTMCNC: 33 G/DL (ref 32–36)
MCV RBC AUTO: 100 FL (ref 82–98)
MICROSCOPIC COMMENT: ABNORMAL
MONOCYTES # BLD AUTO: 0.5 K/UL (ref 0.3–1)
MONOCYTES NFR BLD: 7.1 % (ref 4–15)
NEUTROPHILS # BLD AUTO: 4.5 K/UL (ref 1.8–7.7)
NEUTROPHILS NFR BLD: 61.8 % (ref 38–73)
NITRITE UR QL STRIP: NEGATIVE
NRBC BLD-RTO: 0 /100 WBC
OB PNL STL: POSITIVE
PH UR STRIP: 7 [PH] (ref 5–8)
PLATELET # BLD AUTO: 323 K/UL (ref 150–350)
PMV BLD AUTO: 9.3 FL (ref 9.2–12.9)
POTASSIUM SERPL-SCNC: 4 MMOL/L (ref 3.5–5.1)
PROT SERPL-MCNC: 7.1 G/DL (ref 6–8.4)
PROT UR QL STRIP: NEGATIVE
RBC # BLD AUTO: 4.55 M/UL (ref 4–5.4)
RBC #/AREA URNS HPF: 7 /HPF (ref 0–4)
SODIUM SERPL-SCNC: 138 MMOL/L (ref 136–145)
SP GR UR STRIP: 1.01 (ref 1–1.03)
SQUAMOUS #/AREA URNS HPF: 1 /HPF
URN SPEC COLLECT METH UR: ABNORMAL
UROBILINOGEN UR STRIP-ACNC: NEGATIVE EU/DL
WBC # BLD AUTO: 7.34 K/UL (ref 3.9–12.7)
WBC #/AREA URNS HPF: 1 /HPF (ref 0–5)

## 2019-06-25 PROCEDURE — 82272 OCCULT BLD FECES 1-3 TESTS: CPT

## 2019-06-25 PROCEDURE — 85025 COMPLETE CBC W/AUTO DIFF WBC: CPT

## 2019-06-25 PROCEDURE — 80053 COMPREHEN METABOLIC PANEL: CPT

## 2019-06-25 PROCEDURE — 81000 URINALYSIS NONAUTO W/SCOPE: CPT

## 2019-06-25 PROCEDURE — 99283 EMERGENCY DEPT VISIT LOW MDM: CPT

## 2019-06-26 NOTE — ED PROVIDER NOTES
Encounter Date: 6/25/2019       History     Chief Complaint   Patient presents with    Rectal Bleeding     Natalie Domingo is a 64 y.o female with PMHx including hyperlipidemia, hypertension, cancer and migraine. She presents to ED with complaint of rectal bleeding  Patient reports 2 episodes of blood in stool since yesterday  No bleeding at this time. No rectal pain. No hx of hemorrhoids   She denies abdominal pain. No N/V/D. No constipation issues  No fever, fatigue, body aches or chills          Review of patient's allergies indicates:   Allergen Reactions    Zocor [simvastatin] Rash     Past Medical History:   Diagnosis Date    Attention deficit disorder without mention of hyperactivity     Breast cancer     Breast cancer 2010    left    Encounter for blood transfusion     Generalized osteoarthrosis, unspecified site     Hyperlipidemia     Hypertension     Migraine     Spinal stenosis      Past Surgical History:   Procedure Laterality Date    ARTHROSCOPY-MENISCECTOMY Left 6/29/2017    [facility]    bladder lift  2016    BLOCK-FACET-LUMBAR- [place] L3/4 4/5 5/S1 Bilateral 12/15/2017    Per[facility] Raffi Fernandez Jr., [facility] at [facility] OR    BREAST SURGERY  2010    Double Mastectomy, Cancer    CARPAL TUNNEL RELEASE      CATARACT EXTRACTION, BILATERAL      COLONOSCOPY N/A 3/29/2019    [facility]O    CYSTOSCOPY N/A 4/27/2016    Performed by Bel Soliman [facility] at [facility] OR    CYSTOSCOPY N/A 4/11/2016    [facility] Bel [facility] at [facility] OR    EYE SURGERY      BILAT CATARACT    KNEE SURGERY      MASTECTOMY      double    RETROPUBIC SLING N/A 4/27/2016    Per[facility] Bel [facility] at [facility] OR    SYMPATHECTOMY  1982 or 1983    TONSILLECTOMY       Family History   Problem Relation Age of Onset    Heart disease Mother      Social History     Tobacco Use    Smoking status: Current Every Day Smoker      Packs/day: 0.50     Years: 42.00     Pack years: 21.00     Types: Cigarettes    Smokeless tobacco: Never Used   Substance Use Topics    Alcohol use: Yes     Comment: social    Drug use: No     Review of Systems   Constitutional: Negative.  Negative for fever.   HENT: Negative.  Negative for sore throat.    Eyes: Negative.    Respiratory: Negative for shortness of breath.    Cardiovascular: Negative for chest pain.   Gastrointestinal: Positive for blood in stool. Negative for abdominal distention, abdominal pain, anal bleeding, constipation, diarrhea, nausea and vomiting.   Genitourinary: Negative for dysuria.   Musculoskeletal: Negative for back pain.   Skin: Negative for rash.   Neurological: Negative for weakness.   Hematological: Does not bruise/bleed easily.       Physical Exam     Initial Vitals [06/25/19 1137]   BP Pulse Resp Temp SpO2   (!) 140/95 78 18 97.7 °F (36.5 °C) 96 %      MAP       --         Physical Exam    Nursing note and vitals reviewed.  Constitutional: She appears well-developed and well-nourished.   HENT:   Head: Normocephalic.   Eyes: Pupils are equal, round, and reactive to light.   Neck: Normal range of motion.   Cardiovascular: Normal rate, regular rhythm and normal heart sounds.   Pulmonary/Chest: Breath sounds normal.   Musculoskeletal: Normal range of motion.   Neurological: She is alert and oriented to person, place, and time.   Skin: Skin is warm and dry.   Psychiatric: She has a normal mood and affect. Her behavior is normal. Judgment and thought content normal.         ED Course   Procedures  Labs Reviewed   CBC W/ AUTO DIFFERENTIAL - Abnormal; Notable for the following components:       Result Value    Mean Corpuscular Volume 100 (*)     Mean Corpuscular Hemoglobin 33.2 (*)     All other components within normal limits   OCCULT BLOOD X 1, STOOL - Abnormal; Notable for the following components:    Occult Blood Positive (*)     All other components within normal limits    URINALYSIS, REFLEX TO URINE CULTURE - Abnormal; Notable for the following components:    Occult Blood UA 1+ (*)     All other components within normal limits    Narrative:     Preferred Collection Type->Urine, Clean Catch   URINALYSIS MICROSCOPIC - Abnormal; Notable for the following components:    RBC, UA 7 (*)     All other components within normal limits    Narrative:     Preferred Collection Type->Urine, Clean Catch   COMPREHENSIVE METABOLIC PANEL          Imaging Results    None          Medical Decision Making:   Initial Assessment:   Patient with complaint of rectal bleeding  Patient reports 2 episodes of blood in stool since yesterday  No bleeding at this time. No rectal pain. No hx of hemorrhoids   She denies abdominal pain. No N/V/D. No constipation issues  No fever, fatigue, body aches or chills  Differential Diagnosis:   GI bleed    Hemorrhoid , fissure    Cancer, polyp, diverticulitis   ED Management:  Hemoccult positive    CBC normal     Consult for general surgery    No rectal bleeding while in ED    Discussed physical exam findings with patient  No acute emergent medical condition identified at this time to warrant further testing/diagnostics  At this time, I believe the patient is clinically stable for discharge.   Patient to follow up with PCP in 1-2 days.  The patient acknowledges that close follow up with a MD is required after all ER visits  Pt given instructions; take all medications prescribed in the ER as directed.   Patient agrees to comply with all instruction and direction given in the ER  Pt agrees to return to ER if any symptoms reoccur                                 Clinical Impression:       ICD-10-CM ICD-9-CM   1. Rectal bleed K62.5 569.3                                Nany Ceron NP  06/26/19 0764

## 2021-03-08 ENCOUNTER — IMMUNIZATION (OUTPATIENT)
Dept: FAMILY MEDICINE | Facility: CLINIC | Age: 66
End: 2021-03-08

## 2021-03-08 DIAGNOSIS — Z23 NEED FOR VACCINATION: Primary | ICD-10-CM

## 2021-03-08 PROCEDURE — 0011A COVID-19, MRNA, LNP-S, PF, 100 MCG/0.5 ML DOSE VACCINE: ICD-10-PCS | Mod: CV19,S$GLB,, | Performed by: FAMILY MEDICINE

## 2021-03-08 PROCEDURE — 91301 COVID-19, MRNA, LNP-S, PF, 100 MCG/0.5 ML DOSE VACCINE: ICD-10-PCS | Mod: S$GLB,,, | Performed by: FAMILY MEDICINE

## 2021-03-08 PROCEDURE — 0011A COVID-19, MRNA, LNP-S, PF, 100 MCG/0.5 ML DOSE VACCINE: CPT | Mod: CV19,S$GLB,, | Performed by: FAMILY MEDICINE

## 2021-03-08 PROCEDURE — 91301 COVID-19, MRNA, LNP-S, PF, 100 MCG/0.5 ML DOSE VACCINE: CPT | Mod: S$GLB,,, | Performed by: FAMILY MEDICINE

## 2021-04-05 ENCOUNTER — IMMUNIZATION (OUTPATIENT)
Dept: FAMILY MEDICINE | Facility: CLINIC | Age: 66
End: 2021-04-05
Payer: COMMERCIAL

## 2021-04-05 DIAGNOSIS — Z23 NEED FOR VACCINATION: Primary | ICD-10-CM

## 2021-04-05 PROCEDURE — 91301 COVID-19, MRNA, LNP-S, PF, 100 MCG/0.5 ML DOSE VACCINE: ICD-10-PCS | Mod: S$GLB,,, | Performed by: FAMILY MEDICINE

## 2021-04-05 PROCEDURE — 0012A COVID-19, MRNA, LNP-S, PF, 100 MCG/0.5 ML DOSE VACCINE: CPT | Mod: CV19,S$GLB,, | Performed by: FAMILY MEDICINE

## 2021-04-05 PROCEDURE — 0012A COVID-19, MRNA, LNP-S, PF, 100 MCG/0.5 ML DOSE VACCINE: ICD-10-PCS | Mod: CV19,S$GLB,, | Performed by: FAMILY MEDICINE

## 2021-04-05 PROCEDURE — 91301 COVID-19, MRNA, LNP-S, PF, 100 MCG/0.5 ML DOSE VACCINE: CPT | Mod: S$GLB,,, | Performed by: FAMILY MEDICINE

## 2021-07-01 ENCOUNTER — PATIENT MESSAGE (OUTPATIENT)
Dept: ADMINISTRATIVE | Facility: OTHER | Age: 66
End: 2021-07-01

## 2021-12-27 ENCOUNTER — IMMUNIZATION (OUTPATIENT)
Dept: FAMILY MEDICINE | Facility: CLINIC | Age: 66
End: 2021-12-27
Payer: MEDICARE

## 2021-12-27 DIAGNOSIS — Z23 NEED FOR VACCINATION: Primary | ICD-10-CM

## 2021-12-27 PROCEDURE — 0064A COVID-19, MRNA, LNP-S, PF, 100 MCG/0.25 ML DOSE VACCINE (MODERNA BOOSTER): CPT | Mod: PBBFAC

## 2022-04-18 ENCOUNTER — IMMUNIZATION (OUTPATIENT)
Dept: FAMILY MEDICINE | Facility: CLINIC | Age: 67
End: 2022-04-18
Payer: MEDICARE

## 2022-04-18 DIAGNOSIS — Z23 NEED FOR VACCINATION: Primary | ICD-10-CM

## 2022-04-18 PROCEDURE — 0064A COVID-19, MRNA, LNP-S, PF, 100 MCG/0.25 ML DOSE VACCINE (MODERNA BOOSTER): CPT | Mod: PBBFAC

## 2022-04-18 NOTE — PROGRESS NOTES
Natalie Domingo arrive to clinic awake and alert.  Pt verified name and .   Allergies reviewed with patient.  Pt given Moderna 0.25ml Im RD per patient request.    denied further needs.    Patient instructed to wait 15 mins after injection.   Patient states no vaccines in the last 14 days.  Vaccine information sheet was given to patient. Patient voiced understanding and was advised on medication amount and name of medication.

## 2022-12-01 ENCOUNTER — PATIENT MESSAGE (OUTPATIENT)
Dept: FAMILY MEDICINE | Facility: CLINIC | Age: 67
End: 2022-12-01
Payer: MEDICARE

## 2022-12-19 ENCOUNTER — IMMUNIZATION (OUTPATIENT)
Dept: FAMILY MEDICINE | Facility: CLINIC | Age: 67
End: 2022-12-19
Payer: MEDICARE

## 2022-12-19 DIAGNOSIS — Z23 NEED FOR VACCINATION: Primary | ICD-10-CM

## 2022-12-19 PROCEDURE — 91301 COVID-19, MRNA, LNP-S, PF, 100 MCG/0.5 ML DOSE VACCINE: CPT | Mod: PBBFAC

## 2023-12-18 PROBLEM — K57.30 DIVERTICULOSIS OF COLON: Status: RESOLVED | Noted: 2019-03-29 | Resolved: 2023-12-18

## 2023-12-18 PROBLEM — Z12.11 ENCOUNTER FOR SCREENING COLONOSCOPY: Status: RESOLVED | Noted: 2019-03-29 | Resolved: 2023-12-18

## (undated) DEVICE — TOURNIQUET SB QC DP 34X4IN

## (undated) DEVICE — NDL SAFETY 21G X 1 1/2 ECLPSE

## (undated) DEVICE — TOWELS STERILE 18 X 25.5

## (undated) DEVICE — TUBING FLOSTEADY ARTHROSCOPY

## (undated) DEVICE — SEE MEDLINE ITEM 152622

## (undated) DEVICE — SUT ETHILON 3-0 PS2 18 BLK

## (undated) DEVICE — DRAPE STERI INSTRUMENT 1018

## (undated) DEVICE — GLOVE PROTEXIS PI SYN SURG 6.5

## (undated) DEVICE — SEE MEDLINE ITEM 146271

## (undated) DEVICE — GLOVE SURG ULTRA TOUCH 9

## (undated) DEVICE — SEE MEDLINE ITEM 152530

## (undated) DEVICE — PADDING CAST 6 INCH

## (undated) DEVICE — COLD THER SYS W/PAD UNV RH

## (undated) DEVICE — PACK ARTHROSCOPY W/ISO BAC

## (undated) DEVICE — PADDING CAST SPECIALIST 6X4YD

## (undated) DEVICE — DRESSING N ADH OIL EMUL 3X3

## (undated) DEVICE — DRAPE STERI U-SHAPED 47X51IN

## (undated) DEVICE — NDL QUINCKE POINT 20GA 6IN

## (undated) DEVICE — CONTAINER SPECIMEN STRL 4OZ

## (undated) DEVICE — CANISTER SUCTION 3000CC

## (undated) DEVICE — COVER SURG LIGHT HANDLE

## (undated) DEVICE — KIT ENDO CARRY ON PROCEDURE

## (undated) DEVICE — SKINMARKER W/RULER DEVON

## (undated) DEVICE — BLADE BONE CUTTER 3.5MM

## (undated) DEVICE — SEE MEDLINE ITEM 147518

## (undated) DEVICE — BLADE SURG CARBON STEEL SZ11

## (undated) DEVICE — GOWN SURG 2XL DISP TIE BACK

## (undated) DEVICE — SYS LABEL CORRECT MED

## (undated) DEVICE — SEE MEDLINE ITEM 146308

## (undated) DEVICE — DRESSING N ADH OIL EMUL 3X8 3S

## (undated) DEVICE — SEE MEDLINE ITEM 152487

## (undated) DEVICE — PAD ABD 8X10 STERILE

## (undated) DEVICE — CANISTER SUCTION MEDI-VAC 12L

## (undated) DEVICE — APPLICATOR CHLORAPREP ORN 26ML

## (undated) DEVICE — SEE MEDLINE ITEM 146292

## (undated) DEVICE — NDL HYPODERMIC BLUNT 18G 1.5IN

## (undated) DEVICE — SEE MEDLINE ITEM 157131

## (undated) DEVICE — SPONGE GAUZE 16PLY 4X4

## (undated) DEVICE — SLEEVE SCD EXPRESS CALF MEDIUM

## (undated) DEVICE — SEE MEDLINE ITEM 146313

## (undated) DEVICE — SYR SLIP TIP 5CC

## (undated) DEVICE — PAD UNDERPAD 30X30

## (undated) DEVICE — NDL SPINAL 18GX3.5 SPINOCAN

## (undated) DEVICE — SYR 10CC LUER LOCK

## (undated) DEVICE — STRAP OR TABLE 5IN X 72IN

## (undated) DEVICE — GAUZE SPONGE BULKEE 6X6.75IN

## (undated) DEVICE — SEE MEDLINE ITEM 146231

## (undated) DEVICE — SEE MEDLINE ITEM 157117

## (undated) DEVICE — GLOVE SURG ULTRA TOUCH 8.5

## (undated) DEVICE — SOL IRR NACL .9% 3000ML

## (undated) DEVICE — GLOVE BIOGEL SKINSENSE PI 7.5

## (undated) DEVICE — GLOVE RADIATION RESISTENC SZ8

## (undated) DEVICE — NDL BOX COUNTER

## (undated) DEVICE — MAT QUICK 40X30 FLOOR FLUID LF

## (undated) DEVICE — SOL IRRI STRL WATER 1000ML